# Patient Record
Sex: MALE | Race: WHITE | NOT HISPANIC OR LATINO | Employment: OTHER | URBAN - METROPOLITAN AREA
[De-identification: names, ages, dates, MRNs, and addresses within clinical notes are randomized per-mention and may not be internally consistent; named-entity substitution may affect disease eponyms.]

---

## 2024-06-11 ENCOUNTER — APPOINTMENT (OUTPATIENT)
Dept: CARDIOLOGY | Facility: HOSPITAL | Age: 74
End: 2024-06-11
Payer: COMMERCIAL

## 2024-06-11 ENCOUNTER — HOSPITAL ENCOUNTER (INPATIENT)
Facility: HOSPITAL | Age: 74
LOS: 2 days | Discharge: HOME | End: 2024-06-14
Attending: STUDENT IN AN ORGANIZED HEALTH CARE EDUCATION/TRAINING PROGRAM | Admitting: INTERNAL MEDICINE
Payer: COMMERCIAL

## 2024-06-11 ENCOUNTER — APPOINTMENT (OUTPATIENT)
Dept: RADIOLOGY | Facility: HOSPITAL | Age: 74
End: 2024-06-11
Payer: COMMERCIAL

## 2024-06-11 DIAGNOSIS — I50.9 ACUTE ON CHRONIC CONGESTIVE HEART FAILURE, UNSPECIFIED HEART FAILURE TYPE (MULTI): ICD-10-CM

## 2024-06-11 DIAGNOSIS — R07.9 CHEST PAIN, UNSPECIFIED: ICD-10-CM

## 2024-06-11 DIAGNOSIS — R55 SYNCOPE, UNSPECIFIED SYNCOPE TYPE: Primary | ICD-10-CM

## 2024-06-11 DIAGNOSIS — F17.219 CIGARETTE NICOTINE DEPENDENCE WITH NICOTINE-INDUCED DISORDER: ICD-10-CM

## 2024-06-11 DIAGNOSIS — I47.20 VENTRICULAR TACHYCARDIA (MULTI): ICD-10-CM

## 2024-06-11 DIAGNOSIS — I47.29 OTHER VENTRICULAR TACHYCARDIA (MULTI): ICD-10-CM

## 2024-06-11 DIAGNOSIS — R55 SYNCOPE AND COLLAPSE: ICD-10-CM

## 2024-06-11 LAB
ALBUMIN SERPL BCP-MCNC: 4 G/DL (ref 3.4–5)
ALP SERPL-CCNC: 51 U/L (ref 33–136)
ALT SERPL W P-5'-P-CCNC: 15 U/L (ref 10–52)
ANION GAP SERPL CALC-SCNC: 10 MMOL/L (ref 10–20)
AST SERPL W P-5'-P-CCNC: 15 U/L (ref 9–39)
BASOPHILS # BLD AUTO: 0.06 X10*3/UL (ref 0–0.1)
BASOPHILS NFR BLD AUTO: 1 %
BILIRUB SERPL-MCNC: 0.5 MG/DL (ref 0–1.2)
BNP SERPL-MCNC: 252 PG/ML (ref 0–99)
BUN SERPL-MCNC: 19 MG/DL (ref 6–23)
CALCIUM SERPL-MCNC: 8.9 MG/DL (ref 8.6–10.3)
CARDIAC TROPONIN I PNL SERPL HS: 6 NG/L (ref 0–20)
CARDIAC TROPONIN I PNL SERPL HS: 9 NG/L (ref 0–20)
CHLORIDE SERPL-SCNC: 106 MMOL/L (ref 98–107)
CO2 SERPL-SCNC: 27 MMOL/L (ref 21–32)
CREAT SERPL-MCNC: 0.7 MG/DL (ref 0.5–1.3)
EGFRCR SERPLBLD CKD-EPI 2021: >90 ML/MIN/1.73M*2
EOSINOPHIL # BLD AUTO: 0.14 X10*3/UL (ref 0–0.4)
EOSINOPHIL NFR BLD AUTO: 2.3 %
ERYTHROCYTE [DISTWIDTH] IN BLOOD BY AUTOMATED COUNT: 14.1 % (ref 11.5–14.5)
GLUCOSE SERPL-MCNC: 114 MG/DL (ref 74–99)
HCT VFR BLD AUTO: 44.4 % (ref 41–52)
HGB BLD-MCNC: 14.3 G/DL (ref 13.5–17.5)
IMM GRANULOCYTES # BLD AUTO: 0.02 X10*3/UL (ref 0–0.5)
IMM GRANULOCYTES NFR BLD AUTO: 0.3 % (ref 0–0.9)
LYMPHOCYTES # BLD AUTO: 1.7 X10*3/UL (ref 0.8–3)
LYMPHOCYTES NFR BLD AUTO: 27.7 %
MAGNESIUM SERPL-MCNC: 2.06 MG/DL (ref 1.6–2.4)
MCH RBC QN AUTO: 29.7 PG (ref 26–34)
MCHC RBC AUTO-ENTMCNC: 32.2 G/DL (ref 32–36)
MCV RBC AUTO: 92 FL (ref 80–100)
MONOCYTES # BLD AUTO: 0.56 X10*3/UL (ref 0.05–0.8)
MONOCYTES NFR BLD AUTO: 9.1 %
NEUTROPHILS # BLD AUTO: 3.66 X10*3/UL (ref 1.6–5.5)
NEUTROPHILS NFR BLD AUTO: 59.6 %
NRBC BLD-RTO: 0 /100 WBCS (ref 0–0)
PLATELET # BLD AUTO: 266 X10*3/UL (ref 150–450)
POTASSIUM SERPL-SCNC: 4.3 MMOL/L (ref 3.5–5.3)
PROT SERPL-MCNC: 6.5 G/DL (ref 6.4–8.2)
RBC # BLD AUTO: 4.81 X10*6/UL (ref 4.5–5.9)
SODIUM SERPL-SCNC: 139 MMOL/L (ref 136–145)
WBC # BLD AUTO: 6.1 X10*3/UL (ref 4.4–11.3)

## 2024-06-11 PROCEDURE — G0378 HOSPITAL OBSERVATION PER HR: HCPCS

## 2024-06-11 PROCEDURE — 2500000002 HC RX 250 W HCPCS SELF ADMINISTERED DRUGS (ALT 637 FOR MEDICARE OP, ALT 636 FOR OP/ED): Performed by: NURSE PRACTITIONER

## 2024-06-11 PROCEDURE — 36415 COLL VENOUS BLD VENIPUNCTURE: CPT | Performed by: STUDENT IN AN ORGANIZED HEALTH CARE EDUCATION/TRAINING PROGRAM

## 2024-06-11 PROCEDURE — 80053 COMPREHEN METABOLIC PANEL: CPT | Performed by: STUDENT IN AN ORGANIZED HEALTH CARE EDUCATION/TRAINING PROGRAM

## 2024-06-11 PROCEDURE — 71275 CT ANGIOGRAPHY CHEST: CPT

## 2024-06-11 PROCEDURE — 2500000001 HC RX 250 WO HCPCS SELF ADMINISTERED DRUGS (ALT 637 FOR MEDICARE OP): Performed by: NURSE PRACTITIONER

## 2024-06-11 PROCEDURE — G0390 TRAUMA RESPONS W/HOSP CRITI: HCPCS

## 2024-06-11 PROCEDURE — 94640 AIRWAY INHALATION TREATMENT: CPT

## 2024-06-11 PROCEDURE — 71045 X-RAY EXAM CHEST 1 VIEW: CPT | Performed by: STUDENT IN AN ORGANIZED HEALTH CARE EDUCATION/TRAINING PROGRAM

## 2024-06-11 PROCEDURE — 70450 CT HEAD/BRAIN W/O DYE: CPT | Performed by: STUDENT IN AN ORGANIZED HEALTH CARE EDUCATION/TRAINING PROGRAM

## 2024-06-11 PROCEDURE — 2500000004 HC RX 250 GENERAL PHARMACY W/ HCPCS (ALT 636 FOR OP/ED): Performed by: NURSE PRACTITIONER

## 2024-06-11 PROCEDURE — 84484 ASSAY OF TROPONIN QUANT: CPT | Mod: 91 | Performed by: STUDENT IN AN ORGANIZED HEALTH CARE EDUCATION/TRAINING PROGRAM

## 2024-06-11 PROCEDURE — 85025 COMPLETE CBC W/AUTO DIFF WBC: CPT | Performed by: STUDENT IN AN ORGANIZED HEALTH CARE EDUCATION/TRAINING PROGRAM

## 2024-06-11 PROCEDURE — 83735 ASSAY OF MAGNESIUM: CPT | Performed by: STUDENT IN AN ORGANIZED HEALTH CARE EDUCATION/TRAINING PROGRAM

## 2024-06-11 PROCEDURE — 71045 X-RAY EXAM CHEST 1 VIEW: CPT

## 2024-06-11 PROCEDURE — 99285 EMERGENCY DEPT VISIT HI MDM: CPT | Mod: 25

## 2024-06-11 PROCEDURE — 70450 CT HEAD/BRAIN W/O DYE: CPT

## 2024-06-11 PROCEDURE — 72125 CT NECK SPINE W/O DYE: CPT | Performed by: STUDENT IN AN ORGANIZED HEALTH CARE EDUCATION/TRAINING PROGRAM

## 2024-06-11 PROCEDURE — 84484 ASSAY OF TROPONIN QUANT: CPT | Performed by: STUDENT IN AN ORGANIZED HEALTH CARE EDUCATION/TRAINING PROGRAM

## 2024-06-11 PROCEDURE — 71275 CT ANGIOGRAPHY CHEST: CPT | Performed by: RADIOLOGY

## 2024-06-11 PROCEDURE — 2550000001 HC RX 255 CONTRASTS: Performed by: STUDENT IN AN ORGANIZED HEALTH CARE EDUCATION/TRAINING PROGRAM

## 2024-06-11 PROCEDURE — 83880 ASSAY OF NATRIURETIC PEPTIDE: CPT | Performed by: STUDENT IN AN ORGANIZED HEALTH CARE EDUCATION/TRAINING PROGRAM

## 2024-06-11 PROCEDURE — 93005 ELECTROCARDIOGRAM TRACING: CPT

## 2024-06-11 PROCEDURE — 74174 CTA ABD&PLVS W/CONTRAST: CPT | Performed by: RADIOLOGY

## 2024-06-11 PROCEDURE — 72125 CT NECK SPINE W/O DYE: CPT

## 2024-06-11 RX ORDER — LANOLIN ALCOHOL/MO/W.PET/CERES
1000 CREAM (GRAM) TOPICAL DAILY
Status: DISCONTINUED | OUTPATIENT
Start: 2024-06-12 | End: 2024-06-14 | Stop reason: HOSPADM

## 2024-06-11 RX ORDER — ALBUTEROL SULFATE 0.83 MG/ML
2.5 SOLUTION RESPIRATORY (INHALATION) EVERY 4 HOURS PRN
Status: DISCONTINUED | OUTPATIENT
Start: 2024-06-11 | End: 2024-06-11

## 2024-06-11 RX ORDER — ACETAMINOPHEN 650 MG/1
650 SUPPOSITORY RECTAL EVERY 4 HOURS PRN
Status: DISCONTINUED | OUTPATIENT
Start: 2024-06-11 | End: 2024-06-14 | Stop reason: HOSPADM

## 2024-06-11 RX ORDER — SPIRONOLACTONE 25 MG/1
0.5 TABLET ORAL DAILY
COMMUNITY
End: 2024-06-14 | Stop reason: HOSPADM

## 2024-06-11 RX ORDER — IPRATROPIUM BROMIDE AND ALBUTEROL SULFATE 2.5; .5 MG/3ML; MG/3ML
3 SOLUTION RESPIRATORY (INHALATION) EVERY 2 HOUR PRN
Status: DISCONTINUED | OUTPATIENT
Start: 2024-06-11 | End: 2024-06-14 | Stop reason: HOSPADM

## 2024-06-11 RX ORDER — CARVEDILOL 6.25 MG/1
6.25 TABLET ORAL 2 TIMES DAILY
COMMUNITY

## 2024-06-11 RX ORDER — ALBUTEROL SULFATE 0.83 MG/ML
2.5 SOLUTION RESPIRATORY (INHALATION) AS NEEDED
COMMUNITY
End: 2024-06-14 | Stop reason: HOSPADM

## 2024-06-11 RX ORDER — ATORVASTATIN CALCIUM 40 MG/1
40 TABLET, FILM COATED ORAL DAILY
Status: DISCONTINUED | OUTPATIENT
Start: 2024-06-12 | End: 2024-06-14 | Stop reason: HOSPADM

## 2024-06-11 RX ORDER — ACETAMINOPHEN 325 MG/1
650 TABLET ORAL EVERY 4 HOURS PRN
Status: DISCONTINUED | OUTPATIENT
Start: 2024-06-11 | End: 2024-06-14 | Stop reason: HOSPADM

## 2024-06-11 RX ORDER — IPRATROPIUM BROMIDE AND ALBUTEROL SULFATE 2.5; .5 MG/3ML; MG/3ML
3 SOLUTION RESPIRATORY (INHALATION)
Status: DISCONTINUED | OUTPATIENT
Start: 2024-06-11 | End: 2024-06-11

## 2024-06-11 RX ORDER — MAGNESIUM SULFATE HEPTAHYDRATE 40 MG/ML
2 INJECTION, SOLUTION INTRAVENOUS ONCE
Status: COMPLETED | OUTPATIENT
Start: 2024-06-11 | End: 2024-06-12

## 2024-06-11 RX ORDER — CLOPIDOGREL BISULFATE 75 MG/1
75 TABLET ORAL DAILY
COMMUNITY

## 2024-06-11 RX ORDER — CLOPIDOGREL BISULFATE 75 MG/1
75 TABLET ORAL DAILY
Status: DISCONTINUED | OUTPATIENT
Start: 2024-06-12 | End: 2024-06-14 | Stop reason: HOSPADM

## 2024-06-11 RX ORDER — ACETAMINOPHEN 160 MG/5ML
650 SOLUTION ORAL EVERY 4 HOURS PRN
Status: DISCONTINUED | OUTPATIENT
Start: 2024-06-11 | End: 2024-06-14 | Stop reason: HOSPADM

## 2024-06-11 RX ORDER — CARVEDILOL 3.12 MG/1
6.25 TABLET ORAL 2 TIMES DAILY
Status: DISCONTINUED | OUTPATIENT
Start: 2024-06-11 | End: 2024-06-14 | Stop reason: HOSPADM

## 2024-06-11 RX ORDER — IBUPROFEN 200 MG
1 TABLET ORAL DAILY PRN
Status: DISCONTINUED | OUTPATIENT
Start: 2024-06-11 | End: 2024-06-14 | Stop reason: HOSPADM

## 2024-06-11 RX ORDER — LANOLIN ALCOHOL/MO/W.PET/CERES
1000 CREAM (GRAM) TOPICAL DAILY
COMMUNITY

## 2024-06-11 RX ORDER — CHOLECALCIFEROL (VITAMIN D3) 25 MCG
1000 TABLET ORAL DAILY
Status: DISCONTINUED | OUTPATIENT
Start: 2024-06-12 | End: 2024-06-14 | Stop reason: HOSPADM

## 2024-06-11 RX ORDER — ALBUTEROL SULFATE 0.83 MG/ML
2.5 SOLUTION RESPIRATORY (INHALATION) EVERY 2 HOUR PRN
Status: DISCONTINUED | OUTPATIENT
Start: 2024-06-11 | End: 2024-06-12

## 2024-06-11 RX ORDER — IPRATROPIUM BROMIDE AND ALBUTEROL SULFATE 2.5; .5 MG/3ML; MG/3ML
3 SOLUTION RESPIRATORY (INHALATION)
Status: COMPLETED | OUTPATIENT
Start: 2024-06-11 | End: 2024-06-11

## 2024-06-11 RX ORDER — ALBUTEROL SULFATE 90 UG/1
1-2 AEROSOL, METERED RESPIRATORY (INHALATION) EVERY 6 HOURS PRN
Status: ON HOLD | COMMUNITY
End: 2024-06-14

## 2024-06-11 RX ORDER — VIT C/E/ZN/COPPR/LUTEIN/ZEAXAN 250MG-90MG
25 CAPSULE ORAL DAILY
COMMUNITY

## 2024-06-11 RX ORDER — ATORVASTATIN CALCIUM 40 MG/1
40 TABLET, FILM COATED ORAL DAILY
COMMUNITY

## 2024-06-11 RX ADMIN — IPRATROPIUM BROMIDE AND ALBUTEROL SULFATE 3 ML: .5; 3 SOLUTION RESPIRATORY (INHALATION) at 19:32

## 2024-06-11 RX ADMIN — SACUBITRIL AND VALSARTAN 1 TABLET: 24; 26 TABLET, FILM COATED ORAL at 23:29

## 2024-06-11 RX ADMIN — MAGNESIUM SULFATE HEPTAHYDRATE 2 G: 40 INJECTION, SOLUTION INTRAVENOUS at 23:30

## 2024-06-11 RX ADMIN — APIXABAN 5 MG: 5 TABLET, FILM COATED ORAL at 23:29

## 2024-06-11 RX ADMIN — IPRATROPIUM BROMIDE AND ALBUTEROL SULFATE 3 ML: .5; 3 SOLUTION RESPIRATORY (INHALATION) at 19:31

## 2024-06-11 RX ADMIN — CARVEDILOL 6.25 MG: 3.12 TABLET, FILM COATED ORAL at 23:29

## 2024-06-11 RX ADMIN — METHYLPREDNISOLONE SODIUM SUCCINATE 125 MG: 125 INJECTION, POWDER, FOR SOLUTION INTRAMUSCULAR; INTRAVENOUS at 23:29

## 2024-06-11 RX ADMIN — IPRATROPIUM BROMIDE AND ALBUTEROL SULFATE 3 ML: .5; 3 SOLUTION RESPIRATORY (INHALATION) at 19:36

## 2024-06-11 RX ADMIN — IOHEXOL 109 ML: 350 INJECTION, SOLUTION INTRAVENOUS at 16:49

## 2024-06-11 SDOH — SOCIAL STABILITY: SOCIAL INSECURITY: WERE YOU ABLE TO COMPLETE ALL THE BEHAVIORAL HEALTH SCREENINGS?: YES

## 2024-06-11 SDOH — SOCIAL STABILITY: SOCIAL INSECURITY: DO YOU FEEL ANYONE HAS EXPLOITED OR TAKEN ADVANTAGE OF YOU FINANCIALLY OR OF YOUR PERSONAL PROPERTY?: NO

## 2024-06-11 SDOH — SOCIAL STABILITY: SOCIAL INSECURITY: ARE THERE ANY APPARENT SIGNS OF INJURIES/BEHAVIORS THAT COULD BE RELATED TO ABUSE/NEGLECT?: NO

## 2024-06-11 SDOH — SOCIAL STABILITY: SOCIAL INSECURITY: HAS ANYONE EVER THREATENED TO HURT YOUR FAMILY OR YOUR PETS?: NO

## 2024-06-11 SDOH — SOCIAL STABILITY: SOCIAL INSECURITY: ARE YOU OR HAVE YOU BEEN THREATENED OR ABUSED PHYSICALLY, EMOTIONALLY, OR SEXUALLY BY ANYONE?: NO

## 2024-06-11 SDOH — SOCIAL STABILITY: SOCIAL INSECURITY: DOES ANYONE TRY TO KEEP YOU FROM HAVING/CONTACTING OTHER FRIENDS OR DOING THINGS OUTSIDE YOUR HOME?: NO

## 2024-06-11 SDOH — SOCIAL STABILITY: SOCIAL INSECURITY: DO YOU FEEL UNSAFE GOING BACK TO THE PLACE WHERE YOU ARE LIVING?: NO

## 2024-06-11 SDOH — SOCIAL STABILITY: SOCIAL INSECURITY: ABUSE: ADULT

## 2024-06-11 SDOH — SOCIAL STABILITY: SOCIAL INSECURITY: HAVE YOU HAD THOUGHTS OF HARMING ANYONE ELSE?: NO

## 2024-06-11 SDOH — SOCIAL STABILITY: SOCIAL INSECURITY: HAVE YOU HAD ANY THOUGHTS OF HARMING ANYONE ELSE?: NO

## 2024-06-11 ASSESSMENT — COGNITIVE AND FUNCTIONAL STATUS - GENERAL
MOVING TO AND FROM BED TO CHAIR: A LITTLE
DAILY ACTIVITIY SCORE: 22
PATIENT BASELINE BEDBOUND: NO
TOILETING: A LITTLE
MOBILITY SCORE: 19
WALKING IN HOSPITAL ROOM: A LITTLE
HELP NEEDED FOR BATHING: A LITTLE
STANDING UP FROM CHAIR USING ARMS: A LITTLE
CLIMB 3 TO 5 STEPS WITH RAILING: A LOT

## 2024-06-11 ASSESSMENT — ACTIVITIES OF DAILY LIVING (ADL)
LACK_OF_TRANSPORTATION: NO
JUDGMENT_ADEQUATE_SAFELY_COMPLETE_DAILY_ACTIVITIES: YES
ADEQUATE_TO_COMPLETE_ADL: YES
FEEDING YOURSELF: INDEPENDENT
GROOMING: INDEPENDENT
WALKS IN HOME: NEEDS ASSISTANCE
HEARING - RIGHT EAR: FUNCTIONAL
TOILETING: NEEDS ASSISTANCE
BATHING: INDEPENDENT
PATIENT'S MEMORY ADEQUATE TO SAFELY COMPLETE DAILY ACTIVITIES?: YES
DRESSING YOURSELF: INDEPENDENT
HEARING - LEFT EAR: FUNCTIONAL

## 2024-06-11 ASSESSMENT — COLUMBIA-SUICIDE SEVERITY RATING SCALE - C-SSRS
2. HAVE YOU ACTUALLY HAD ANY THOUGHTS OF KILLING YOURSELF?: NO
1. IN THE PAST MONTH, HAVE YOU WISHED YOU WERE DEAD OR WISHED YOU COULD GO TO SLEEP AND NOT WAKE UP?: NO
6. HAVE YOU EVER DONE ANYTHING, STARTED TO DO ANYTHING, OR PREPARED TO DO ANYTHING TO END YOUR LIFE?: NO
2. HAVE YOU ACTUALLY HAD ANY THOUGHTS OF KILLING YOURSELF?: NO
1. IN THE PAST MONTH, HAVE YOU WISHED YOU WERE DEAD OR WISHED YOU COULD GO TO SLEEP AND NOT WAKE UP?: NO
6. HAVE YOU EVER DONE ANYTHING, STARTED TO DO ANYTHING, OR PREPARED TO DO ANYTHING TO END YOUR LIFE?: NO

## 2024-06-11 ASSESSMENT — PATIENT HEALTH QUESTIONNAIRE - PHQ9
1. LITTLE INTEREST OR PLEASURE IN DOING THINGS: NOT AT ALL
2. FEELING DOWN, DEPRESSED OR HOPELESS: NOT AT ALL
SUM OF ALL RESPONSES TO PHQ9 QUESTIONS 1 & 2: 0

## 2024-06-11 ASSESSMENT — LIFESTYLE VARIABLES
HOW MANY STANDARD DRINKS CONTAINING ALCOHOL DO YOU HAVE ON A TYPICAL DAY: PATIENT DOES NOT DRINK
HAVE PEOPLE ANNOYED YOU BY CRITICIZING YOUR DRINKING: NO
TOTAL SCORE: 0
PRESCIPTION_ABUSE_PAST_12_MONTHS: NO
HOW OFTEN DO YOU HAVE A DRINK CONTAINING ALCOHOL: NEVER
HAVE YOU EVER FELT YOU SHOULD CUT DOWN ON YOUR DRINKING: NO
EVER FELT BAD OR GUILTY ABOUT YOUR DRINKING: NO
AUDIT-C TOTAL SCORE: 0
EVER HAD A DRINK FIRST THING IN THE MORNING TO STEADY YOUR NERVES TO GET RID OF A HANGOVER: NO
HOW OFTEN DO YOU HAVE 6 OR MORE DRINKS ON ONE OCCASION: NEVER
SKIP TO QUESTIONS 9-10: 1
AUDIT-C TOTAL SCORE: 0
SUBSTANCE_ABUSE_PAST_12_MONTHS: NO

## 2024-06-11 ASSESSMENT — PAIN - FUNCTIONAL ASSESSMENT
PAIN_FUNCTIONAL_ASSESSMENT: 0-10
PAIN_FUNCTIONAL_ASSESSMENT: 0-10

## 2024-06-11 ASSESSMENT — PAIN SCALES - GENERAL
PAINLEVEL_OUTOF10: 0 - NO PAIN

## 2024-06-11 NOTE — H&P
History Of Present Illness  Osmany Michele is a 73-year-old male with past medical history of CAD s/p multiple stents, SSS s/p PPM, s/p aortic repair/graft, nicotine use disorder, and COPD.  [facility] due to reported chest pain and syncope.  Patient states that he told the office that he had difficult time sleeping the night prior and was having chest pain and felt that it was his COPD causing him issues.  He does endorse recent increased utilization of rescue inhaler and nebulizers at home the past several days.  Reports cough, orthopnea, and wheezing.  2 nights ago he additionally had a syncopal episode.  Reports that he was sitting on the side of the bed, when he stood up he became lightheaded and passed out.  States he fell forward.  Follow-up broken by bags on the floor, however he does endorse hitting his head.  Immediately regained consciousness. He denies any associated injuries.    ER course: Hemodynamically stable, mildly tachypneic 20-30.  EKG showed atrial paced rhythm, no recent EKG for comparison.  CBC unremarkable.  Glucose 114, CMP normal otherwise.  .  High-sensitivity troponin 9, repeat 6.  CT head without acute findings.  Small geographic area of cystic encephalomalacia and gliosis present in the right occipital lobe likely representing sequela of prior infarct/injury.  CT C-spine without acute trauma.  CT angio chest without acute pathology. infrarenal aortobiiliac stent graft without aneurysm or endoleak..  Presently moderate stenosis of the left external iliac artery.  Left lateral saccular outpouching at the level of the diaphragm short in length where the aorta measures 32 mm in caliber.  Atherosclerotic changes with few areas of small penetrating atherosclerotic ulcers in the thoracic aortic arch and descending thoracic aortic.  Dilated loops of the jejunum in the left abdomen.  Age-indeterminate L2 and L4 vertebral body compression  deformities.    Limited records in EMR for comparison    Past medical history: As above  Past surgical history: As above; additional history of left hip fracture repair  Social history: Current smoker, trying to quit.  Denies alcohol abuse or illicit drug use.  [facility]  Family history: Heart disease.     Past Medical History  No past medical history on file.    Surgical History  No past surgical history on file.     Social History  He has no history on file for tobacco use, alcohol use, and drug use.    Family History  No family history on file.     Allergies  Effexor [venlafaxine] and Baclofen    Review of Systems     10 point ROS negative except as noted above.    Physical Exam  Constitutional:       General: He is awake. He is not in acute distress.     Appearance: Normal appearance. He is not toxic-appearing.   HENT:      Head: Atraumatic.      Nose: Nose normal.      Mouth/Throat:      Mouth: Mucous membranes are moist.   Eyes:      Extraocular Movements: Extraocular movements intact.      Conjunctiva/sclera: Conjunctivae normal.      Pupils: Pupils are equal, round, and reactive to light.   Cardiovascular:      Rate and Rhythm: Normal rate and regular rhythm.      Pulses: Normal pulses.      Heart sounds: No murmur heard.  Pulmonary:      Effort: Respiratory distress present.      Breath sounds: Wheezing present.      Comments: Diminished breath sounds and cough  Abdominal:      General: Bowel sounds are normal. There is no distension.      Palpations: Abdomen is soft.      Tenderness: There is no abdominal tenderness. There is no guarding.   Musculoskeletal:         General: No swelling, deformity or signs of injury. Normal range of motion.      Cervical back: Neck supple.      Right lower leg: No edema.      Left lower leg: No edema.   Skin:     General: Skin is warm and dry.      Capillary Refill: Capillary refill takes less than 2 seconds.      Findings: No ecchymosis, erythema or  "wound.   Neurological:      General: No focal deficit present.      Mental Status: He is alert and oriented to person, place, and time.   Psychiatric:         Mood and Affect: Mood normal.         Behavior: Behavior is cooperative.          Last Recorded Vitals  Blood pressure 134/85, pulse 62, temperature 36.4 °C (97.5 °F), temperature source Temporal, resp. rate 20, height 1.854 m (6' 1\"), weight 78 kg (172 lb), SpO2 98%.    Relevant Results      CT head W O contrast trauma protocol    Result Date: 6/11/2024  Interpreted By:  Blaise Noonan, STUDY: CT HEAD W/O CONTRAST TRAUMA PROTOCOL; CT CERVICAL SPINE WO IV CONTRAST;  6/11/2024 4:47 pm   INDICATION: Signs/Symptoms:syncope, head trauma on eliquis.   COMPARISON: None.   ACCESSION NUMBER(S): [id number]; [id number]   ORDERING CLINICIAN: JOSE ALEJANDRO STEPHENS   TECHNIQUE: Noncontrast axial CT scan of head was performed, with coronal and sagittal reformats provided. The images were reviewed in bone, brain, blood and soft tissue windows.   Axial CT images of the cervical spine are obtained. Axial, coronal and sagittal reconstructions are provided for review.   FINDINGS: CT HEAD:   No hyperdense intracranial hemorrhage is identified. No mass effect or midline shift is present.   Gray-white differentiation is intact without evidence of acute CT apparent transcortical infarct. Small focus of encephalomalacia and gliosis is present in the right occipital lobe (series 203, image 54) likely representing sequela of prior infarct/injury.   No ventricular dilatation is present. Basal cisterns are patent. No extra-axial fluid collections are identified.   Scalp soft tissues do not demonstrate any acute abnormality. Calvarium is unremarkable. Mastoid air cells and middle ear cavities are clear.   Visualized paranasal sinuses are unremarkable in appearance.   CT C-SPINE:   There is slight reversal normal lordotic curvature of the cervical spine, with a 2-3 mm anterolisthesis " of C7 on T1.   Cervical vertebral body heights are preserved without evidence of compression fractures, although mild multilevel insufficiency endplate changes are present with associated Schmorl's nodes, most pronounced along the superior endplate of C4, superior endplate of C5 and superior endplate of C6.   Craniocervical junction is intact. Facet joints are preserved without evidence of subluxation or perching. No evidence of acute trauma to the posterior elements of the cervical spine is identified. No displaced transverse or spinous process fracture is present.   Multilevel intervertebral disc height loss is present, moderate at C4-C5 and C5-C6.   No high-grade stenosis is identified in the cervical spine, although at least mild spinal canal narrowing is suspected at the levels of C4-C5 and C5-C6 due to disc osteophyte complexes and ligamentum flavum thickening.   Mild-to-moderate neural foraminal narrowing is present at the level of C4-C5 and C5-C6 bilaterally due to endplate spurring hypertrophic uncovertebral and facet joint changes.       CT HEAD: 1. No evidence of hemorrhage, skull fracture, or other acute intracranial trauma/abnormality. 2. Small geographic area of cystic encephalomalacia and gliosis is present in the right occipital lobe, likely representing sequela of prior infarct/injury.   CT C-SPINE: 1. No evidence of acute trauma to the cervical spine. 2. Multilevel degenerative changes of the cervical spine, with mild spinal canal narrowing suspected at the levels of C4-C5 and C5-C6 due to disc osteophyte complex and ligamentum flavum thickening mild-to-moderate neural foraminal stenosis suspected at C4-C5 and C5-C6 due to endplate spurring and hypertrophic uncovertebral and facet joint changes..   MACRO: None   Signed by: Blaise Noonan 6/11/2024 5:22 PM Dictation workstation:   [id number]    CT cervical spine wo IV contrast    Result Date: 6/11/2024  Interpreted By:  Shaista  Blaise, STUDY: CT HEAD W/O CONTRAST TRAUMA PROTOCOL; CT CERVICAL SPINE WO IV CONTRAST;  6/11/2024 4:47 pm   INDICATION: Signs/Symptoms:syncope, head trauma on eliquis.   COMPARISON: None.   ACCESSION NUMBER(S): [id number]; [id number]   ORDERING CLINICIAN: JOSE ALEJANDRO STEPHENS   TECHNIQUE: Noncontrast axial CT scan of head was performed, with coronal and sagittal reformats provided. The images were reviewed in bone, brain, blood and soft tissue windows.   Axial CT images of the cervical spine are obtained. Axial, coronal and sagittal reconstructions are provided for review.   FINDINGS: CT HEAD:   No hyperdense intracranial hemorrhage is identified. No mass effect or midline shift is present.   Gray-white differentiation is intact without evidence of acute CT apparent transcortical infarct. Small focus of encephalomalacia and gliosis is present in the right occipital lobe (series 203, image 54) likely representing sequela of prior infarct/injury.   No ventricular dilatation is present. Basal cisterns are patent. No extra-axial fluid collections are identified.   Scalp soft tissues do not demonstrate any acute abnormality. Calvarium is unremarkable. Mastoid air cells and middle ear cavities are clear.   Visualized paranasal sinuses are unremarkable in appearance.   CT C-SPINE:   There is slight reversal normal lordotic curvature of the cervical spine, with a 2-3 mm anterolisthesis of C7 on T1.   Cervical vertebral body heights are preserved without evidence of compression fractures, although mild multilevel insufficiency endplate changes are present with associated Schmorl's nodes, most pronounced along the superior endplate of C4, superior endplate of C5 and superior endplate of C6.   Craniocervical junction is intact. Facet joints are preserved without evidence of subluxation or perching. No evidence of acute trauma to the posterior elements of the cervical spine is identified. No displaced transverse or spinous  process fracture is present.   Multilevel intervertebral disc height loss is present, moderate at C4-C5 and C5-C6.   No high-grade stenosis is identified in the cervical spine, although at least mild spinal canal narrowing is suspected at the levels of C4-C5 and C5-C6 due to disc osteophyte complexes and ligamentum flavum thickening.   Mild-to-moderate neural foraminal narrowing is present at the level of C4-C5 and C5-C6 bilaterally due to endplate spurring hypertrophic uncovertebral and facet joint changes.       CT HEAD: 1. No evidence of hemorrhage, skull fracture, or other acute intracranial trauma/abnormality. 2. Small geographic area of cystic encephalomalacia and gliosis is present in the right occipital lobe, likely representing sequela of prior infarct/injury.   CT C-SPINE: 1. No evidence of acute trauma to the cervical spine. 2. Multilevel degenerative changes of the cervical spine, with mild spinal canal narrowing suspected at the levels of C4-C5 and C5-C6 due to disc osteophyte complex and ligamentum flavum thickening mild-to-moderate neural foraminal stenosis suspected at C4-C5 and C5-C6 due to endplate spurring and hypertrophic uncovertebral and facet joint changes..   MACRO: None   Signed by: Blaise Noonan 6/11/2024 5:22 PM Dictation workstation:   [id number]    CT angio chest abdomen pelvis    Result Date: 6/11/2024  Interpreted By:  Gordo Maldonado, STUDY: CT ANGIO CHEST ABDOMEN PELVIS; ;  6/11/2024 4:47 pm   CT ANGIOGRAM CHEST, ABDOMEN AND PELVIS WITHOUT AND WITH CONTRAST   INDICATION: Signs/Symptoms:h/o aorta repiair with CP and syncope. 73-year-old man with chest pain.   COMPARISON: Chest radiograph 06/11/2024   ACCESSION NUMBER(S): [id number]   ORDERING CLINICIAN: JOSE ALEJANDRO STEPHENS   TECHNIQUE: Contiguous acquired helical axial images were obtained of the chest, abdomen, and pelvis without and with intravascular contrast. Coronal and sagittal multiplanar reformatted maximum intensity  projection images were obtained. 3D volumetric surface rendered representation of arterial structures was performed on a separate workstation and submitted for interpretation.   FINDINGS: Angiographic:   The heart size is enlarged. Right heart pacer leads enter the left subclavian vein adjacent to the left anterosuperior chest wall generator tract.   The examination is not optimized for sensitive evaluation of pulmonary arteries. There are no large central pulmonary artery filling defects.   The ascending thoracic aorta all and arch vessels are normal in caliber and patent. There is mild-to-moderate intimal irregularity with calcified and noncalcified atherosclerotic plaque of the aortic arch which is nonaneurysmal by size criteria. There is irregularity of noncalcified atherosclerotic plaque of the descending thoracic aorta with few areas of of small positive the ventral wall penetrating atherosclerotic ulceration (for example image [id number]).   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 The celiac, superior mesenteric, and bilateral renal arteries are patent. The patient is status post infrarenal abdominal aorto bi-iliac stent graft placement with patency of graft components extending into the iliac arteries bilaterally. The origin of the inferior mesenteric artery has been covered however distal branches are opacified via collateralization.   The right common iliac artery stent graft component is patent. The right internal and external iliac arteries are patent. There is mild atherosclerotic narrowing of the right common femoral artery and the partially imaged upstream right superficial and profunda femoris arteries are patent.   The left common iliac artery stent graft component is patent. There is tortuosity of the left internal and external iliac arteries with  associated moderate to severe narrowing of the left external iliac artery (image 521 of 633). Distally, the left internal and external iliac arteries are patent. The left common femoral arteries mildly atherosclerotic and patent. The partially imaged upstream left superficial and profunda femoris arteries are patent.   Large iliac veins the pelvis and the IVC are grossly unremarkable. The renal veins are patent. The portal, superior mesenteric, and splenic veins are patent. Hepatic veins are patent. Suspect high-grade stenosis or possible occlusion of the left brachiocephalic vein adjacent to pacer leads. The internal jugular veins are patent bilaterally.       Nonangiographic:   The partially imaged thyroid is unremarkable. No supraclavicular or thoracic inlet lymphadenopathy. No pathologically enlarged mediastinal or hilar lymph nodes. There is apical predominant centrilobular emphysema. Respiratory motion limits evaluation of the pulmonary parenchyma. There a few areas of pleural calcification including the hemidiaphragms and the posteromedial aspects of the parietal pleura. No pleural effusion. The liver is normal in size and shape. The gallbladder is nondistended. No biliary ductal dilation. The spleen, adrenal glands, and pancreas are unremarkable. The renal parenchyma enhances symmetrically. A simple attenuating posterior left renal cystic lesion measuring 19 mm in diameter. Few other subcentimeter low attenuating renal lesions too small to further characterize. The urinary bladder is partially decompressed limiting evaluation. Prostate and seminal vesicles are unremarkable. No retroperitoneal lymphadenopathy. Hiatal hernia. Dilated loops of fluid filled jejunum in the left abdomen, with adjacent loops of wall thickening versus decompression. Distal loops are normal in caliber. Distal ileum shows gas and fluid as can be seen with feculization of the small bowel. The appendix is normal. Stool in the colon.  Sigmoid predominant diverticulosis without evidence of diverticulitis. There is a left inguinal hernia containing loops of small bowel without evidence of incarceration.   There is age indeterminate compression deformities of L3 and L2. Left femoral neck fixation hardware.         1. No acute aortic pathology. 2. Infrarenal aorto bi-iliac stent graft. No abdominal aortic aneurysm or evidence of endoleak. Stent graft components are patent. 3. Mild to moderate thoracic aortic arch and descending thoracic aortic atherosclerosis with few areas of small penetrating atherosclerotic ulcers. There is additionally left lateral saccular outpouching at the level of the diaphragm short in length where the aorta measures 32 mm in caliber. 4. Suspect at least moderate grade stenosis of the left external iliac artery related to atherosclerosis and tortuosity just distal to the left common iliac artery bifurcation.   5. Dilated loops of jejunum in the left abdomen, adjacent to areas of small bowel wall thickening versus is decompression. Consider functional obstruction, enteritis. No mechanical bowel obstruction identified. 6. Left inguinal hernia containing loops of small bowel without evidence of incarceration. 7. Sigmoid diverticulosis. 8. Age indeterminate L2 and L4 vertebral body compression deformities.   MACRO: None   Signed by: Gordo Maldonado 6/11/2024 5:22 PM Dictation workstation:   [id number]    XR chest 1 view    Result Date: 6/11/2024  Interpreted By:  Blaise Noonan, STUDY: XR CHEST 1 VIEW;  6/11/2024 4:10 pm   INDICATION: Signs/Symptoms:Chest Pain.   COMPARISON: Radiographs of the chest dated 10/27/2007.   ACCESSION NUMBER(S): [id number]   ORDERING CLINICIAN: JOSE ALEJANDRO STEPHENS   FINDINGS: AP radiograph of the chest was provided.   Multi lead left subclavian AICD/pacemaker is present, similar in appearance to prior exam.   CARDIOMEDIASTINAL SILHOUETTE: Cardiomediastinal silhouette is normal in size and  configuration.   LUNGS: No consolidation or pleural effusion is evident. Dense material in the right lung base may represent pleural calyx.   ABDOMEN: No remarkable upper abdominal findings.   BONES: No acute osseous changes.       1.  No evidence of consolidation or pleural effusion. New dense material in the right lung base may represent pleural calcifications.       MACRO: None   Signed by: Blaise Noonan 6/11/2024 4:31 PM Dictation workstation:   [id number]     Results for orders placed or performed during the hospital encounter of 06/11/24 (from the past 24 hour(s))   CBC and Auto Differential   Result Value Ref Range    WBC 6.1 4.4 - 11.3 x10*3/uL    nRBC 0.0 0.0 - 0.0 /100 WBCs    RBC 4.81 4.50 - 5.90 x10*6/uL    Hemoglobin 14.3 13.5 - 17.5 g/dL    Hematocrit 44.4 41.0 - 52.0 %    MCV 92 80 - 100 fL    MCH 29.7 26.0 - 34.0 pg    MCHC 32.2 32.0 - 36.0 g/dL    RDW 14.1 11.5 - 14.5 %    Platelets 266 150 - 450 x10*3/uL    Neutrophils % 59.6 40.0 - 80.0 %    Immature Granulocytes %, Automated 0.3 0.0 - 0.9 %    Lymphocytes % 27.7 13.0 - 44.0 %    Monocytes % 9.1 2.0 - 10.0 %    Eosinophils % 2.3 0.0 - 6.0 %    Basophils % 1.0 0.0 - 2.0 %    Neutrophils Absolute 3.66 1.60 - 5.50 x10*3/uL    Immature Granulocytes Absolute, Automated 0.02 0.00 - 0.50 x10*3/uL    Lymphocytes Absolute 1.70 0.80 - 3.00 x10*3/uL    Monocytes Absolute 0.56 0.05 - 0.80 x10*3/uL    Eosinophils Absolute 0.14 0.00 - 0.40 x10*3/uL    Basophils Absolute 0.06 0.00 - 0.10 x10*3/uL   Comprehensive Metabolic Panel   Result Value Ref Range    Glucose 114 (H) 74 - 99 mg/dL    Sodium 139 136 - 145 mmol/L    Potassium 4.3 3.5 - 5.3 mmol/L    Chloride 106 98 - 107 mmol/L    Bicarbonate 27 21 - 32 mmol/L    Anion Gap 10 10 - 20 mmol/L    Urea Nitrogen 19 6 - 23 mg/dL    Creatinine 0.70 0.50 - 1.30 mg/dL    eGFR >90 >60 mL/min/1.73m*2    Calcium 8.9 8.6 - 10.3 mg/dL    Albumin 4.0 3.4 - 5.0 g/dL    Alkaline Phosphatase 51 33 - 136 U/L    Total  Protein 6.5 6.4 - 8.2 g/dL    AST 15 9 - 39 U/L    Bilirubin, Total 0.5 0.0 - 1.2 mg/dL    ALT 15 10 - 52 U/L   Magnesium   Result Value Ref Range    Magnesium 2.06 1.60 - 2.40 mg/dL   B-Type Natriuretic Peptide   Result Value Ref Range     (H) 0 - 99 pg/mL   Troponin I, High Sensitivity, Initial   Result Value Ref Range    Troponin I, High Sensitivity 9 0 - 20 ng/L   Troponin, High Sensitivity, 1 Hour   Result Value Ref Range    Troponin I, High Sensitivity 6 0 - 20 ng/L          Assessment/Plan   Principal Problem:    Syncope and collapse    73-year-old male with past medical history of CAD s/p multiple stents, SSS s/p PPM, s/p aortic repair/graft, nicotine use disorder, and COPD.  [facility] due to reported chest pain and syncope.    #COPD exacerbation  #Chest pain suspect secondary to coughing and  #Nicotine use disorder    Pulmonology consult  Supplemental oxygen as needed  IV azithromycin once daily  Nebulizers as needed and scheduled  Solu-Medrol 40 mg every 8 hours  RT evaluate and treat  Nicotine patch as needed for nicotine cravings    #Syncope  #History of permanent pacemaker  #History CAD s/p multiple stents he is active and will help one of her after the rounding    Telemetry monitoring  Consult cardiology, appreciate input  Check orthostatic vitals  Echocardiogram  Carotid duplex  Continue home dose of Plavix and Eliquis    #Abnormal imaging  #History infrarenal aortobiiliac stent graft  Consult vascular surgery for input on abnormal imaging findings on CT angio chest    #DVT prophylaxis  Continue home dose of Eliquis    oJzef Wilson, APRN-CNP

## 2024-06-11 NOTE — PROGRESS NOTES
Pharmacy Medication History Review    Osmany Michele is a 73 y.o. male admitted for No Principal Problem: There is no principal problem currently on the Problem List. Please update the Problem List and refresh.. Pharmacy reviewed the patient's [id number] medications and allergies for accuracy.    The list below reflectives the updated PTA list. Please review each medication in order reconciliation for additional clarification and justification.    SEE NOTES IN PRIOR TO ADMISSION LIST    Prior to Admission medications    Medication Sig Start Date End Date Taking? Authorizing Provider   albuterol 90 mcg/actuation inhaler Inhale 1-2 puffs every 6 hours if needed.   Yes Historical Provider, MD   albuterol 2.5 mg /3 mL (0.083 %) nebulizer solution Take 3 mL (2.5 mg) by nebulization if needed for wheezing.    Historical Provider, MD   apixaban (Eliquis) 5 mg tablet Take 1 tablet (5 mg) by mouth 2 times a day.    Historical Provider, MD   atorvastatin (Lipitor) 40 mg tablet Take 1 tablet (40 mg) by mouth once daily.    Historical Provider, MD   carvedilol (Coreg) 6.25 mg tablet Take 1 tablet (6.25 mg) by mouth 2 times a day.    Historical Provider, MD   cholecalciferol (Vitamin D-3) 25 MCG (1000 UT) capsule Take 1 capsule (25 mcg) by mouth once daily.    Historical Provider, MD   clopidogrel (Plavix) 75 mg tablet Take 1 tablet (75 mg) by mouth once daily.    Historical Provider, MD   cyanocobalamin (Vitamin B-12) 1,000 mcg tablet Take 1 tablet (1,000 mcg) by mouth once daily.    Historical Provider, MD   empagliflozin (Jardiance) 25 mg Take 0.5 tablets (12.5 mg) by mouth once daily.    Historical Provider, MD   sacubitriL-valsartan (Entresto) 24-26 mg tablet Take 1 tablet by mouth 2 times a day.    Historical Provider, MD   spironolactone (Aldactone) 25 mg tablet Take 0.5 tablets (12.5 mg) by mouth once daily.    Historical Provider, MD        The list below reflectives the updated allergy list. Please review  each documented allergy for additional clarification and justification.  Allergies  Reviewed by Aurora Horowitz RN on 6/11/2024        Severity Reactions Comments    Effexor [venlafaxine] Not Specified Unknown [facility]    Baclofen Low Dizziness, Nausea/vomiting From [facility]              Mar Pridemore

## 2024-06-11 NOTE — ED PROVIDER NOTES
"HPI   Chief Complaint   Patient presents with    Chest Pain    Shortness of Breath    Syncope     [facility] pt states he has chest pain and increased SOB when lying flat, denies cp and sob while sitting up at this time. Pt states he had syncopal episode 2 days ago while sitting on edge of bed, stating he felt lightheaded then fell on the floor, head landing on a bag that was on the floor- denies complaints from fall at this time. Pt states he has loose pacemaker wires per [facility] staff. Pt denies pain at this time.        [facility] \"loose pacemaker.\"    Patient states that he had a syncopal episode 2 days ago.  States that he was sitting on the side of his bed when he felt lightheaded and fell forward.  He did strike his face.  He does think that he briefly lost consciousness.  He denies headache, vision changes, nausea, vomiting, and focal numbness/weakness since the fall.  Does state that he is on Eliquis.    Has also noted intermittent chest pain over the past 2 days.  States that it is worse at night when he is laying flat.  He does also note shortness of breath at night when he is lying flat.  He denies fevers, chills, productive cough, exertional chest pain, abdominal pain, lower extremity swelling.  He is not currently experiencing chest pain or shortness of breath.    Patient does note a significant cardiovascular history, including CAD status post multiple stents, status post permanent pacemaker placement, and status post aortic repair.      History provided by:  Patient   used: No                        Jacquie Coma Scale Score: 15                     Patient History   No past medical history on file.  No past surgical history on file.  No family history on file.  Social History     Tobacco Use    Smoking status: Not on file    Smokeless tobacco: Not on file   Substance Use Topics    Alcohol use: Not on file    Drug use: Not on file "       Physical Exam   ED Triage Vitals [06/11/24 1535]   Temperature Heart Rate Respirations BP   36.4 °C (97.5 °F) 59 20 120/58      Pulse Ox Temp Source Heart Rate Source Patient Position   97 % Temporal Monitor Sitting      BP Location FiO2 (%)     Right arm --       Physical Exam  Vitals and nursing note reviewed.   HENT:      Head: Atraumatic.      Mouth/Throat:      Mouth: Mucous membranes are moist.   Eyes:      Conjunctiva/sclera: Conjunctivae normal.   Cardiovascular:      Rate and Rhythm: Normal rate and regular rhythm.      Pulses: Normal pulses.      Comments: There is no pitting lower extremity edema or erythema.  Pulmonary:      Effort: Pulmonary effort is normal.      Breath sounds: Normal breath sounds.   Abdominal:      Palpations: Abdomen is soft.      Tenderness: There is no abdominal tenderness.   Musculoskeletal:         General: No deformity.      Cervical back: Normal range of motion.   Skin:     General: Skin is warm and dry.   Neurological:      Mental Status: He is alert.      Comments: Moving all extremities         ED Course & [facility]   ED Course as of 06/11/24 1749 Tue Jun 11, 2024   1638 CT head W O contrast trauma protocol  Per my view, there is no obvious intracranial bleed [AB]   1638 XR chest 1 view  Per my view, there is no obvious widening of the mediastinum or calcium sign [AB]   1643 XR chest 1 view  Pacemaker used to attempt to ID the patient's pacemaker.  Unfortunately, this was not identified as [employer], [employer], [employer], or [employer]. [AB]   1748 Spoke with Pat with [employer] -- non-sustained run of VT on June 8th.  Some episodes of atrial tachycardia. [AB]      ED Course User Index  [AB] Joesph Cazares MD         Diagnoses as of 06/11/24 1749   Syncope, unspecified syncope type   Acute on chronic congestive heart failure, unspecified heart failure type (Multi)       Medical Decision Making  As best I can tell, patient has a past medical history of CAD status  post multiple stents, status post permanent pacemaker placement, status post aorta repair presents with what sounds like multiple complaints.    It sounds like he initially had a syncopal episode 2 days ago with trauma to the head.  Will certainly investigate etiology of syncope in the setting of his cardiac history as well as obtain trauma workup, given head trauma while on Eliquis.  [facility] paged after my initial evaluation.    Patient also reporting chest pain with syncope.  Will evaluate aorta repair in addition.    Given report of orthopnea, do suspect an element of CHF exacerbation.  Given run of nonsustained VT reported by [employer] representative, concern for cardiac etiology of syncope as well.  Will escalate care to hospitalization for further management.    Will also evaluate for CHF exacerbation, given reported orthopnea with significant cardiac this history.    Amount and/or Complexity of Data Reviewed  Independent Historian: EMS  Labs: ordered.  Radiology: ordered and independent interpretation performed. Decision-making details documented in ED Course.  ECG/medicine tests: ordered and independent interpretation performed.     Details: My ECG interpretation: Paced rhythm, heart rate 60, QTc 468  Discussion of management or test interpretation with external provider(s): The admitting medicine physician    Risk  Decision regarding hospitalization.        Procedure  Procedures     Joesph Cazares MD  06/12/24 1935

## 2024-06-12 ENCOUNTER — APPOINTMENT (OUTPATIENT)
Dept: VASCULAR MEDICINE | Facility: HOSPITAL | Age: 74
End: 2024-06-12
Payer: COMMERCIAL

## 2024-06-12 ENCOUNTER — APPOINTMENT (OUTPATIENT)
Dept: CARDIOLOGY | Facility: HOSPITAL | Age: 74
End: 2024-06-12
Payer: COMMERCIAL

## 2024-06-12 PROBLEM — I71.40 ABDOMINAL AORTIC ANEURYSM (AAA) WITHOUT RUPTURE (CMS-HCC): Status: ACTIVE | Noted: 2024-06-12

## 2024-06-12 PROBLEM — F17.219 CIGARETTE NICOTINE DEPENDENCE WITH NICOTINE-INDUCED DISORDER: Status: ACTIVE | Noted: 2024-06-12

## 2024-06-12 PROBLEM — I77.819 ECTATIC AORTA (CMS-HCC): Status: ACTIVE | Noted: 2024-06-12

## 2024-06-12 LAB
ANION GAP SERPL CALC-SCNC: 9 MMOL/L (ref 10–20)
AORTIC VALVE PEAK VELOCITY: 1.03 M/S
ATRIAL RATE: 60 BPM
AV PEAK GRADIENT: 4.2 MMHG
AVA (PEAK VEL): 3.04 CM2
BUN SERPL-MCNC: 20 MG/DL (ref 6–23)
CALCIUM SERPL-MCNC: 8.4 MG/DL (ref 8.6–10.3)
CHLORIDE SERPL-SCNC: 106 MMOL/L (ref 98–107)
CHOLEST SERPL-MCNC: 160 MG/DL (ref 0–199)
CHOLESTEROL/HDL RATIO: 5.8
CO2 SERPL-SCNC: 29 MMOL/L (ref 21–32)
CREAT SERPL-MCNC: 0.72 MG/DL (ref 0.5–1.3)
EGFRCR SERPLBLD CKD-EPI 2021: >90 ML/MIN/1.73M*2
EJECTION FRACTION APICAL 4 CHAMBER: 42.6
ERYTHROCYTE [DISTWIDTH] IN BLOOD BY AUTOMATED COUNT: 14.1 % (ref 11.5–14.5)
GLOBAL LONGITUDINAL STRAIN: 9.3 %
GLUCOSE SERPL-MCNC: 195 MG/DL (ref 74–99)
HCT VFR BLD AUTO: 44.9 % (ref 41–52)
HDLC SERPL-MCNC: 27.6 MG/DL
HGB BLD-MCNC: 13.9 G/DL (ref 13.5–17.5)
LDLC SERPL CALC-MCNC: 119 MG/DL
LEFT VENTRICLE INTERNAL DIMENSION DIASTOLE: 5.8 CM (ref 3.5–6)
LEFT VENTRICULAR OUTFLOW TRACT DIAMETER: 2 CM
LV EJECTION FRACTION BIPLANE: 37 %
MCH RBC QN AUTO: 29 PG (ref 26–34)
MCHC RBC AUTO-ENTMCNC: 31 G/DL (ref 32–36)
MCV RBC AUTO: 94 FL (ref 80–100)
MITRAL VALVE E/A RATIO: 0.45
NON HDL CHOLESTEROL: 132 MG/DL (ref 0–149)
NRBC BLD-RTO: 0 /100 WBCS (ref 0–0)
P AXIS: 63 DEGREES
PLATELET # BLD AUTO: 231 X10*3/UL (ref 150–450)
POTASSIUM SERPL-SCNC: 4.6 MMOL/L (ref 3.5–5.3)
PR INTERVAL: 209 MS
Q ONSET: 251 MS
QRS COUNT: 10 BEATS
QRS DURATION: 147 MS
QT INTERVAL: 468 MS
QTC CALCULATION(BAZETT): 468 MS
QTC FREDERICIA: 468 MS
R AXIS: 96 DEGREES
RBC # BLD AUTO: 4.8 X10*6/UL (ref 4.5–5.9)
RIGHT VENTRICLE FREE WALL PEAK S': 7.72 CM/S
RIGHT VENTRICLE PEAK SYSTOLIC PRESSURE: 25.5 MMHG
SODIUM SERPL-SCNC: 139 MMOL/L (ref 136–145)
T AXIS: -74 DEGREES
T OFFSET: 485 MS
TRICUSPID ANNULAR PLANE SYSTOLIC EXCURSION: 1.9 CM
TRIGL SERPL-MCNC: 68 MG/DL (ref 0–149)
VENTRICULAR RATE: 60 BPM
VLDL: 14 MG/DL (ref 0–40)
WBC # BLD AUTO: 5.7 X10*3/UL (ref 4.4–11.3)

## 2024-06-12 PROCEDURE — 2500000004 HC RX 250 GENERAL PHARMACY W/ HCPCS (ALT 636 FOR OP/ED): Performed by: NURSE PRACTITIONER

## 2024-06-12 PROCEDURE — 94640 AIRWAY INHALATION TREATMENT: CPT | Mod: MUE

## 2024-06-12 PROCEDURE — 80061 LIPID PANEL: CPT | Performed by: NURSE PRACTITIONER

## 2024-06-12 PROCEDURE — 80048 BASIC METABOLIC PNL TOTAL CA: CPT | Performed by: NURSE PRACTITIONER

## 2024-06-12 PROCEDURE — 2500000004 HC RX 250 GENERAL PHARMACY W/ HCPCS (ALT 636 FOR OP/ED)

## 2024-06-12 PROCEDURE — 2500000001 HC RX 250 WO HCPCS SELF ADMINISTERED DRUGS (ALT 637 FOR MEDICARE OP): Performed by: NURSE PRACTITIONER

## 2024-06-12 PROCEDURE — 36415 COLL VENOUS BLD VENIPUNCTURE: CPT | Performed by: NURSE PRACTITIONER

## 2024-06-12 PROCEDURE — 9420000001 HC RT PATIENT EDUCATION 5 MIN

## 2024-06-12 PROCEDURE — 93880 EXTRACRANIAL BILAT STUDY: CPT

## 2024-06-12 PROCEDURE — 93356 MYOCRD STRAIN IMG SPCKL TRCK: CPT | Performed by: STUDENT IN AN ORGANIZED HEALTH CARE EDUCATION/TRAINING PROGRAM

## 2024-06-12 PROCEDURE — 85027 COMPLETE CBC AUTOMATED: CPT | Performed by: NURSE PRACTITIONER

## 2024-06-12 PROCEDURE — 99222 1ST HOSP IP/OBS MODERATE 55: CPT | Performed by: NURSE PRACTITIONER

## 2024-06-12 PROCEDURE — 2500000001 HC RX 250 WO HCPCS SELF ADMINISTERED DRUGS (ALT 637 FOR MEDICARE OP)

## 2024-06-12 PROCEDURE — 1200000002 HC GENERAL ROOM WITH TELEMETRY DAILY

## 2024-06-12 PROCEDURE — 93356 MYOCRD STRAIN IMG SPCKL TRCK: CPT

## 2024-06-12 PROCEDURE — 93306 TTE W/DOPPLER COMPLETE: CPT | Performed by: STUDENT IN AN ORGANIZED HEALTH CARE EDUCATION/TRAINING PROGRAM

## 2024-06-12 PROCEDURE — 99223 1ST HOSP IP/OBS HIGH 75: CPT

## 2024-06-12 PROCEDURE — 99223 1ST HOSP IP/OBS HIGH 75: CPT | Performed by: PSYCHIATRY & NEUROLOGY

## 2024-06-12 PROCEDURE — 93880 EXTRACRANIAL BILAT STUDY: CPT | Performed by: SURGERY

## 2024-06-12 PROCEDURE — 2500000002 HC RX 250 W HCPCS SELF ADMINISTERED DRUGS (ALT 637 FOR MEDICARE OP, ALT 636 FOR OP/ED): Performed by: INTERNAL MEDICINE

## 2024-06-12 RX ORDER — CEFTRIAXONE 1 G/50ML
1 INJECTION, SOLUTION INTRAVENOUS EVERY 24 HOURS
Status: DISCONTINUED | OUTPATIENT
Start: 2024-06-12 | End: 2024-06-12

## 2024-06-12 RX ORDER — BENZONATATE 100 MG/1
100 CAPSULE ORAL 3 TIMES DAILY PRN
Status: DISCONTINUED | OUTPATIENT
Start: 2024-06-12 | End: 2024-06-14 | Stop reason: HOSPADM

## 2024-06-12 RX ORDER — ALBUTEROL SULFATE 0.83 MG/ML
2.5 SOLUTION RESPIRATORY (INHALATION) 3 TIMES DAILY
Status: DISCONTINUED | OUTPATIENT
Start: 2024-06-12 | End: 2024-06-14 | Stop reason: HOSPADM

## 2024-06-12 RX ADMIN — SACUBITRIL AND VALSARTAN 1 TABLET: 24; 26 TABLET, FILM COATED ORAL at 12:51

## 2024-06-12 RX ADMIN — CARVEDILOL 6.25 MG: 3.12 TABLET, FILM COATED ORAL at 09:47

## 2024-06-12 RX ADMIN — ALBUTEROL SULFATE 2.5 MG: 2.5 SOLUTION RESPIRATORY (INHALATION) at 12:20

## 2024-06-12 RX ADMIN — CEFTRIAXONE SODIUM 1 G: 1 INJECTION, SOLUTION INTRAVENOUS at 05:01

## 2024-06-12 RX ADMIN — APIXABAN 5 MG: 5 TABLET, FILM COATED ORAL at 09:47

## 2024-06-12 RX ADMIN — DOXYCYCLINE 100 MG: 100 INJECTION, POWDER, LYOPHILIZED, FOR SOLUTION INTRAVENOUS at 23:43

## 2024-06-12 RX ADMIN — DOXYCYCLINE 100 MG: 100 INJECTION, POWDER, LYOPHILIZED, FOR SOLUTION INTRAVENOUS at 14:22

## 2024-06-12 RX ADMIN — AZITHROMYCIN 500 MG: 500 INJECTION, POWDER, LYOPHILIZED, FOR SOLUTION INTRAVENOUS at 00:01

## 2024-06-12 RX ADMIN — CARVEDILOL 6.25 MG: 3.12 TABLET, FILM COATED ORAL at 21:38

## 2024-06-12 RX ADMIN — Medication 1000 UNITS: at 09:48

## 2024-06-12 RX ADMIN — BENZONATATE 100 MG: 100 CAPSULE ORAL at 05:01

## 2024-06-12 RX ADMIN — ALBUTEROL SULFATE 2.5 MG: 2.5 SOLUTION RESPIRATORY (INHALATION) at 19:44

## 2024-06-12 RX ADMIN — CLOPIDOGREL 75 MG: 75 TABLET ORAL at 09:47

## 2024-06-12 RX ADMIN — METHYLPREDNISOLONE SODIUM SUCCINATE 40 MG: 40 INJECTION, POWDER, FOR SOLUTION INTRAMUSCULAR; INTRAVENOUS at 14:22

## 2024-06-12 RX ADMIN — SACUBITRIL AND VALSARTAN 1 TABLET: 24; 26 TABLET, FILM COATED ORAL at 21:38

## 2024-06-12 RX ADMIN — METHYLPREDNISOLONE SODIUM SUCCINATE 40 MG: 40 INJECTION, POWDER, FOR SOLUTION INTRAMUSCULAR; INTRAVENOUS at 05:01

## 2024-06-12 RX ADMIN — EMPAGLIFLOZIN 12.5 MG: 10 TABLET, FILM COATED ORAL at 09:45

## 2024-06-12 RX ADMIN — CYANOCOBALAMIN TAB 1000 MCG 1000 MCG: 1000 TAB at 12:51

## 2024-06-12 ASSESSMENT — COGNITIVE AND FUNCTIONAL STATUS - GENERAL
MOBILITY SCORE: 24
DAILY ACTIVITIY SCORE: 24

## 2024-06-12 ASSESSMENT — PAIN - FUNCTIONAL ASSESSMENT
PAIN_FUNCTIONAL_ASSESSMENT: 0-10
PAIN_FUNCTIONAL_ASSESSMENT: 0-10

## 2024-06-12 ASSESSMENT — PAIN SCALES - GENERAL
PAINLEVEL_OUTOF10: 0 - NO PAIN
PAINLEVEL_OUTOF10: 0 - NO PAIN

## 2024-06-12 NOTE — NURSING NOTE
06/12/24  0915  Patient Navigator   I introduced myself to the patient and explained my role. Pt states he lives alone and has the help of his brother if needed. He admits that he smokes cigarettes however has decreased the quantity. I did  him on smoking cessation. He doesn't do much exercise but does states he is active I have updated him on the recommendations of 30 min exercise 3 times a week. He states he doesn't cook often at home and eats out frequently. We discussed food options and reviewed the handouts listed below. In addition, I reviewed the Stroke Folder. I reviewed the following lab values and what they indicate: cholesterol, HDL, LDL, & triglycerides. I gave the patient my business card & instructed him to call me as needed. He appreciated my visit and denied any questions or needs. Please see the Education tab for additional information. I have updated the patient's RN of my visit.    Handouts:  What can I eat? American Diabetes Association  Understanding smoking & stroke- World Stroke Organization  How do I follow a healthy diet pattern? American Heart Association  Lifestyle changes to prevent a stroke- American Stroke Association  The connection between diabetes & stroke- American Stroke Association      Sonam SCHULTE, RN  Patient Navigator  Stroke Educator  Diabetes Care &

## 2024-06-12 NOTE — CONSULTS
Consults    Reason For Consult  COPD exacerbation    History Of Present Illness  Osmany Michele is a 73 y.o. male with a past medical history of CAD s/p multiple stents, SSS s/p PPM, s/p aortic repair/graft, nicotine use disorder, COPD, initially presented hospital chest pain and syncope.  Patient states that he believes he was COPD causing his symptoms and not chest pain.  He is at increased utilization of his rescue inhaler or nebulizers at home over the past several days.  He also has a cough that is nonproductive along orthopnea and wheezing.  2 nights ago he did have a syncopal episode.  CXR findings shows no evidence of consolidation or pleural effusion.  There is a new dense material in the right lung base that may represent pleural calcifications.  CT angio chest still shows no PE.  There are some calcifications located in the right lung base however.  He has also had increased smoking cigarettes over the last couple weeks.  He was previously on 1 cigarette a week however is now smoking half a pack a day.  He denies any fever or chills.     Past Medical History  He has no past medical history on file.    Surgical History  He has no past surgical history on file.     Social History  He has no history on file for tobacco use, alcohol use, and drug use.    Family History  No family history on file.     Allergies  Azithromycin, Effexor [venlafaxine], and Baclofen    Review of Systems  A 12 point review of systems was performed and otherwise negative except as stated in the HPI.      Physical Exam  General:  Pleasant and cooperative. No apparent distress.  HEENT:  Normocephalic, atraumatic, mucus membranes moist.   Neck:  Trachea midline.  No JVD.    Chest: Mild bilateral wheezing present.  No crackles.  CV:  Regular rate and rhythm.  Positive S1/S2.   Abdomen: Bowel sounds present in all four quadrants, abdomen is soft, non-tender, non-distended.  Extremities:  No lower extremity edema or cyanosis.    Neurological:  AAOx3. No focal deficits.  Skin:  Warm and dry.        Last Recorded Vitals  /66 (BP Location: Left arm, Patient Position: Standing)   Pulse 72   Temp 36.5 °C (97.7 °F) (Temporal)   Resp 18   Wt 78 kg (172 lb)   SpO2 91%     Relevant Results  All labs and imaging reviewed by myself.     Assessment/Plan   Osmany Kaufman is a 73-year-old male with significant past med history of COPD presenting to hospital with several day history of shortness of breath and nonproductive cough.  Send chest x-ray shows no consolidation or pleural effusion however there is new right lung base pleural calcification.    # COPD exacerbation  - This is a patient with significant history of COPD and is a chronic smoker presenting with a COPD exacerbation.  CXR shows no signs of any pneumonia.  We advised to continue management with submental oxygen, IV azithromycin, nebulizers as needed and scheduled.  - We advised to switch IV Solu-Medrol to once daily.  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  He also had his carpets changed a year ago which creates a lot of dust.  The right-sided pleural calcifications are benign findings breast ptosis.  There are no other signs of any malignancy.  - We advised patient to follow-up in outpatient pulmonology in 6 to 8 weeks.    Savita Sher MD  PGY1 pulmonology

## 2024-06-12 NOTE — PROGRESS NOTES
"   06/12/24 1106   Discharge Planning   Living Arrangements Alone   Support Systems Family members   Type of Residence Homeless   Do you have animals or pets at home? No   Who is requesting discharge planning? Provider   Patient expects to be discharged to: home     Met with pt this am, introduced myself and role.  Pt admit for COPD, pt has [facility] will call this institution and will fax clinicals.  Per pt he left his \"[address]\" apartment and has no living arrangements except for his sister in [place], [place], per pt he plans on going to her home until he finds housing.   Jossie Wang RN T[facility]    [facility] and will [facility]s to [phone].  Jossie Wang RN TCC      "

## 2024-06-12 NOTE — CONSULTS
Inpatient consult to Cardiology  Consult performed by: Anthony Mendez DO  Consult ordered by: Fernando Isaac DO          Reason For Consult  Syncope     History Of Present Illness  Osmany Michele is a 73 y.o. male with medical history significant for CAD status post multiple stents, sick sinus syndrome status post PPM, status post aortic repair/graft, nicotine use disorder, COPD who initially presented to [facility] due to reported chest pain and syncope.  Patient noted he believes his chest pain was likely secondary to his cough that he was having and also noted he was having increased use of his rescue inhaler and nebulizer at home because of the cough which is why he believes he had chest pain.  In addition, patient also noted that he had a syncopal episode where he did admit that he lost consciousness and hit his head.  Patient does not know why he had a syncopal episode he said that he was sitting at the edge of his bed changing and then suddenly felt very dizzy and next thing he knew he was on the floor.  At time of syncopal episode, patient denied any chest pain, diaphoresis and did not think his ICD device fired.  Cardiology team consulted for interrogation of his ICD device.  1.        Past Medical History  He has no past medical history on file.    Surgical History  He has no past surgical history on file.     Social History  He has no history on file for tobacco use, alcohol use, and drug use.    Family History  No family history on file.     Allergies  Azithromycin, Effexor [venlafaxine], and Baclofen    Review of Systems  10 point ROS negfative aside from HPI       Physical Exam  General:  Pleasant and cooperative. No apparent distress.  HEENT:  Normocephalic, atraumatic, mucus membranes moist.   Neck:  Trachea midline.  No JVD.    Chest: Mild bilateral wheezing present.  No crackles.  CV:  Regular rate and rhythm.  Positive S1/S2.   Abdomen: Bowel sounds present in all four quadrants, abdomen is soft,  non-tender, non-distended.  Extremities:  No lower extremity edema or cyanosis.   Neurological:  AAOx3. No focal deficits.  Skin:  Warm and dry.        Last Recorded Vitals  /66 (BP Location: Left arm, Patient Position: Standing)   Pulse 72   Temp 36.5 °C (97.7 °F) (Temporal)   Resp 18   Wt 78 kg (172 lb)   SpO2 93%     Relevant Results  Recent Results (from the past 12 hour(s))   Lipid Panel    Collection Time: 06/12/24  4:52 AM   Result Value Ref Range    Cholesterol 160 0 - 199 mg/dL    HDL-Cholesterol 27.6 mg/dL    Cholesterol/HDL Ratio 5.8     LDL Calculated 119 (H) <=99 mg/dL    VLDL 14 0 - 40 mg/dL    Triglycerides 68 0 - 149 mg/dL    Non HDL Cholesterol 132 0 - 149 mg/dL   CBC    Collection Time: 06/12/24  4:52 AM   Result Value Ref Range    WBC 5.7 4.4 - 11.3 x10*3/uL    nRBC 0.0 0.0 - 0.0 /100 WBCs    RBC 4.80 4.50 - 5.90 x10*6/uL    Hemoglobin 13.9 13.5 - 17.5 g/dL    Hematocrit 44.9 41.0 - 52.0 %    MCV 94 80 - 100 fL    MCH 29.0 26.0 - 34.0 pg    MCHC 31.0 (L) 32.0 - 36.0 g/dL    RDW 14.1 11.5 - 14.5 %    Platelets 231 150 - 450 x10*3/uL   Basic Metabolic Panel    Collection Time: 06/12/24  4:52 AM   Result Value Ref Range    Glucose 195 (H) 74 - 99 mg/dL    Sodium 139 136 - 145 mmol/L    Potassium 4.6 3.5 - 5.3 mmol/L    Chloride 106 98 - 107 mmol/L    Bicarbonate 29 21 - 32 mmol/L    Anion Gap 9 (L) 10 - 20 mmol/L    Urea Nitrogen 20 6 - 23 mg/dL    Creatinine 0.72 0.50 - 1.30 mg/dL    eGFR >90 >60 mL/min/1.73m*2    Calcium 8.4 (L) 8.6 - 10.3 mg/dL   Carotid duplex bilateral    Collection Time: 06/12/24  8:34 AM   Result Value Ref Range    BSA 2 m2   Transthoracic Echo (TTE) Complete    Collection Time: 06/12/24  8:56 AM   Result Value Ref Range    AV pk darshana 1.03 m/s    LVOT diam 2.00 cm    LV Biplane EF 37 %    MV E/A ratio 0.45     Tricuspid annular plane systolic excursion 1.9 cm    RV free wall pk S' 7.72 cm/s    LV GLS 9.3 %    LVIDd 5.80 cm    RVSP 25.5 mmHg    Aortic Valve Area by  Continuity of Peak Velocity 3.04 cm2    AV pk grad 4.2 mmHg    LV A4C EF 42.6          Assessment/Plan   Episode of VT followed by syncope.  Patient's ICD device was interrogated and showed that he had a run of VT shortly before he had a syncopal episode.  Will plan for cardiac catheterization tomorrow to evaluate his coronaries and need for stent placement.  CAD status post multiple stents.  Hold anticoagulation and plan for cardiac catheterization tomorrow.  Nicotine use.  Encourage patient on tobacco cessation as this is contributing to his cardiopulmonary disease.    Anthony Mendez DO

## 2024-06-12 NOTE — CARE PLAN
The patient's goals for the shift include        Problem: Respiratory  Goal: Clear secretions with interventions this shift  Outcome: Progressing  Goal: Minimize anxiety/maximize coping throughout shift  Outcome: Progressing     Problem: Fall/Injury  Goal: Not fall by end of shift  Outcome: Progressing  Goal: Be free from injury by end of the shift  Outcome: Progressing  Goal: Verbalize understanding of personal risk factors for fall in the hospital  Outcome: Progressing

## 2024-06-12 NOTE — CONSULTS
Consults   Vascular Surgery  Reason For Consult   abnormal imaging Hx of infrarenal aortobiiliac stent grafts    History Of Present Illness  Osmany Michele is a 73 y.o. male presenting with  past medical history of CAD s/p multiple stents, SSS s/p PPM, s/p aortic repair/graft, nicotine use disorder, and COPD.  [facility] reported chest pain and syncope.  Patient states that he told the office that he had difficult time sleeping the night prior and was having chest pain and felt that it was his COPD causing him issues.  He does endorse recent increased utilization of rescue inhaler and nebulizers at home the past several days.  Reports cough, orthopnea, and wheezing.  2 nights ago he additionally had a syncopal episode.  Reports that he was sitting on the side of the bed, when he stood up he became lightheaded and passed out.  States he fell forward.  Follow-up broken by bags on the floor, however he does endorse hitting his head.  Immediately regained consciousness. He denies any associated injuries.     ER course: Hemodynamically stable, mildly tachypneic 20-30.  EKG showed atrial paced rhythm, no recent EKG for comparison.  CBC unremarkable.  Glucose 114, CMP normal otherwise.  .  High-sensitivity troponin 9, repeat 6.  CT head without acute findings.  Small geographic area of cystic encephalomalacia and gliosis present in the right occipital lobe likely representing sequela of prior infarct/injury.  CT C-spine without acute trauma.  CT angio chest without acute pathology. infrarenal aortobiiliac stent graft without aneurysm or endoleak..  Presently moderate stenosis of the left external iliac artery.  Left lateral saccular outpouching at the level of the diaphragm short in length where the aorta measures 32 mm in caliber.  Atherosclerotic changes with few areas of small penetrating atherosclerotic ulcers in the thoracic aortic arch and descending thoracic aortic.   Dilated loops of the jejunum in the left abdomen.  Age-indeterminate L2 and L4 vertebral body compression deformities.   Limited records in EMR for comparison   Past medical history: As above  Past surgical history: As above; additional history of left hip fracture repair  Social history: Current smoker, trying to quit.  Denies alcohol abuse or illicit drug use.  [facility]  Family history: Heart disease.   I evaluated the patient at bedside and obtain information from chart review and patient interview.  Patient had a infrarenal abdominal aortic aneurysm repair with bifurcated stent [facility] [place].  Patient sees a vascular surgeon for follow-up in the [facility].   .     Past Medical History  He has no past medical history on file.    Surgical History  He has no past surgical history on file.     Social History  He has no history on file for tobacco use, alcohol use, and drug use.    Family History  No family history on file.     Allergies  Azithromycin, Effexor [venlafaxine], and Baclofen    Review of Systems  A 10 point ROS was performed with the patient denying any complaint at this time aside from those listed in the HPI above.     Physical Exam     Constitutional: Well developed , awake/alert/oriented x3, in no distress,  Eyes: Clear sclera  ENMT: mucous membranes are moist, no apparent injury, no lesions seen,   Head/neck: Neck supple, trachea  is midline, no apparent injury, no bruits, no mass, no stridor  Respiratory/thorax: Breath sounds clear and equal bilaterally very diminished breath sounds throughout, thorax symmetric  Cardiac/Vascular: Regular, rate and rhythm, no murmurs, 2+ radial pulses, palpable bilateral femorals, popliteals, strong bilateral posterior tib's, dopplerable left DP signal  Gastrointestinal: Nondistended soft nontender, positive bowel sounds, no bruits. Negative for pulsatile mass  Musculoskeletal: Moves all extremities, limited range  "of motion , no joint swelling,   Extremities: No cyanosis, no contusions or wounds,   Neurological: Alert and oriented x3,   Lymphatic: No significant lymphadenopathy  Skin: Warm and dry, no lesions, no rashes  Psychological: Appropriate mood and behavior  Last Recorded Vitals  Blood pressure 121/66, pulse 72, temperature 36.5 °C (97.7 °F), temperature source Temporal, resp. rate 18, height 1.854 m (6' 1\"), weight 78 kg (172 lb), SpO2 93%.       Results for orders placed or performed during the hospital encounter of 06/11/24 (from the past 24 hour(s))   CBC and Auto Differential   Result Value Ref Range    WBC 6.1 4.4 - 11.3 x10*3/uL    nRBC 0.0 0.0 - 0.0 /100 WBCs    RBC 4.81 4.50 - 5.90 x10*6/uL    Hemoglobin 14.3 13.5 - 17.5 g/dL    Hematocrit 44.4 41.0 - 52.0 %    MCV 92 80 - 100 fL    MCH 29.7 26.0 - 34.0 pg    MCHC 32.2 32.0 - 36.0 g/dL    RDW 14.1 11.5 - 14.5 %    Platelets 266 150 - 450 x10*3/uL    Neutrophils % 59.6 40.0 - 80.0 %    Immature Granulocytes %, Automated 0.3 0.0 - 0.9 %    Lymphocytes % 27.7 13.0 - 44.0 %    Monocytes % 9.1 2.0 - 10.0 %    Eosinophils % 2.3 0.0 - 6.0 %    Basophils % 1.0 0.0 - 2.0 %    Neutrophils Absolute 3.66 1.60 - 5.50 x10*3/uL    Immature Granulocytes Absolute, Automated 0.02 0.00 - 0.50 x10*3/uL    Lymphocytes Absolute 1.70 0.80 - 3.00 x10*3/uL    Monocytes Absolute 0.56 0.05 - 0.80 x10*3/uL    Eosinophils Absolute 0.14 0.00 - 0.40 x10*3/uL    Basophils Absolute 0.06 0.00 - 0.10 x10*3/uL   Comprehensive Metabolic Panel   Result Value Ref Range    Glucose 114 (H) 74 - 99 mg/dL    Sodium 139 136 - 145 mmol/L    Potassium 4.3 3.5 - 5.3 mmol/L    Chloride 106 98 - 107 mmol/L    Bicarbonate 27 21 - 32 mmol/L    Anion Gap 10 10 - 20 mmol/L    Urea Nitrogen 19 6 - 23 mg/dL    Creatinine 0.70 0.50 - 1.30 mg/dL    eGFR >90 >60 mL/min/1.73m*2    Calcium 8.9 8.6 - 10.3 mg/dL    Albumin 4.0 3.4 - 5.0 g/dL    Alkaline Phosphatase 51 33 - 136 U/L    Total Protein 6.5 6.4 - 8.2 g/dL    " AST 15 9 - 39 U/L    Bilirubin, Total 0.5 0.0 - 1.2 mg/dL    ALT 15 10 - 52 U/L   Magnesium   Result Value Ref Range    Magnesium 2.06 1.60 - 2.40 mg/dL   B-Type Natriuretic Peptide   Result Value Ref Range     (H) 0 - 99 pg/mL   Troponin I, High Sensitivity, Initial   Result Value Ref Range    Troponin I, High Sensitivity 9 0 - 20 ng/L   Troponin, High Sensitivity, 1 Hour   Result Value Ref Range    Troponin I, High Sensitivity 6 0 - 20 ng/L   ECG 12 lead   Result Value Ref Range    Ventricular Rate 60 BPM    Atrial Rate 60 BPM    MS Interval 209 ms    QRS Duration 147 ms    QT Interval 468 ms    QTC Calculation(Bazett) 468 ms    P Axis 63 degrees    R Axis 96 degrees    T Axis -74 degrees    QRS Count 10 beats    Q Onset 251 ms    T Offset 485 ms    QTC Fredericia 468 ms   Lipid Panel   Result Value Ref Range    Cholesterol 160 0 - 199 mg/dL    HDL-Cholesterol 27.6 mg/dL    Cholesterol/HDL Ratio 5.8     LDL Calculated 119 (H) <=99 mg/dL    VLDL 14 0 - 40 mg/dL    Triglycerides 68 0 - 149 mg/dL    Non HDL Cholesterol 132 0 - 149 mg/dL   CBC   Result Value Ref Range    WBC 5.7 4.4 - 11.3 x10*3/uL    nRBC 0.0 0.0 - 0.0 /100 WBCs    RBC 4.80 4.50 - 5.90 x10*6/uL    Hemoglobin 13.9 13.5 - 17.5 g/dL    Hematocrit 44.9 41.0 - 52.0 %    MCV 94 80 - 100 fL    MCH 29.0 26.0 - 34.0 pg    MCHC 31.0 (L) 32.0 - 36.0 g/dL    RDW 14.1 11.5 - 14.5 %    Platelets 231 150 - 450 x10*3/uL   Basic Metabolic Panel   Result Value Ref Range    Glucose 195 (H) 74 - 99 mg/dL    Sodium 139 136 - 145 mmol/L    Potassium 4.6 3.5 - 5.3 mmol/L    Chloride 106 98 - 107 mmol/L    Bicarbonate 29 21 - 32 mmol/L    Anion Gap 9 (L) 10 - 20 mmol/L    Urea Nitrogen 20 6 - 23 mg/dL    Creatinine 0.72 0.50 - 1.30 mg/dL    eGFR >90 >60 mL/min/1.73m*2    Calcium 8.4 (L) 8.6 - 10.3 mg/dL   Carotid duplex bilateral   Result Value Ref Range    BSA 2 m2   Transthoracic Echo (TTE) Complete   Result Value Ref Range    AV pk darshana 1.03 m/s    LVOT diam 2.00  cm    LV Biplane EF 37 %    MV E/A ratio 0.45     Tricuspid annular plane systolic excursion 1.9 cm    RV free wall pk S' 7.72 cm/s    LV GLS 9.3 %    LVIDd 5.80 cm    RVSP 25.5 mmHg    Aortic Valve Area by Continuity of Peak Velocity 3.04 cm2    AV pk grad 4.2 mmHg    LV A4C EF 42.6        Transthoracic Echo (TTE) Complete    Result Date: 6/12/2024    [facility], [address] [place] [id number] [phone] and                                 Fax [phone] TRANSTHORACIC ECHOCARDIOGRAM REPORT  Patient Name:      EMILY ROSS     Reading Physician:    [id number] Jeronimo Moe MD Study Date:        6/12/2024            Ordering Provider:    [id number] JAVIER GARNER MRN/PID:           [id number]             Fellow: Accession#:        [id number]         Nurse: Date of Birth/Age: 1950 / 73      Sonographer:          Faisal Lovelace RDCS                    years Gender:            M                    Additional Staff: Height:            185.42 cm            Admit Date:           6/11/2024 Weight:            78.02 kg             Admission Status:     Inpatient -                                                               Routine BSA / BMI:         2.02 m2 / 22.69      Encounter#:           [id number]                    kg/m2                                         Department Location:  [facility] Blood Pressure: 117 /56 mmHg Study Type:    TRANSTHORACIC ECHO (TTE) COMPLETE Diagnosis/ICD: Chest pain, unspecified-R07.9; Syncope-R55 CPT Code:      Echo Complete w Full Doppler-54617; Myocardial Strain                Imaging-94014 Patient History: Smoker:            Current. Pacer/Defib:       AICD/Perment pacemaker Pertinent History: CAD, COPD, CHF, Dyspnea and Chest Pain. PCI-stent x 2. Study Detail: The following Echo studies were performed: 2D, M-Mode, Doppler,                color flow and Strain. Technically challenging study due to body               habitus and COPD.  PHYSICIAN INTERPRETATION: Left Ventricle: The left ventricular systolic function is moderately decreased, with an estimated ejection fraction of 35-40%. Wall motion is abnormal. The left ventricular cavity size is normal. Left Ventricular Global Longitudinal Strain - 9.3 %. Spectral Doppler shows an impaired relaxation pattern of left ventricular diastolic filling. Left Atrium: The left atrium is normal in size. Right Ventricle: The right ventricle is normal in size. There is normal right ventricular global systolic function. A device is visualized in the right ventricle. Right Atrium: The right atrium is normal in size. There is a device visualized in the right atrium. Aortic Valve: The aortic valve is probably trileaflet. There is no evidence of aortic valve regurgitation. The peak instantaneous gradient of the aortic valve is 4.2 mmHg. Mitral Valve: The mitral valve is normal in structure. There is trace mitral valve regurgitation. Tricuspid Valve: The tricuspid valve is structurally normal. There is trace tricuspid regurgitation. The Doppler estimated RVSP is slightly elevated at 25.5 mmHg. Pulmonic Valve: The pulmonic valve is structurally normal. There is trace pulmonic valve regurgitation. Pericardium: There is no pericardial effusion noted. Aorta: The aortic root is normal. There is mild dilatation of the aortic root.  CONCLUSIONS:  1. Left ventricular systolic function is moderately decreased with a 35-40% estimated ejection fraction.  2. Spectral Doppler shows an impaired relaxation pattern of left ventricular diastolic filling.  3. Slightly elevated RVSP. QUANTITATIVE DATA SUMMARY: 2D MEASUREMENTS:                           Normal Ranges: IVSd:          1.60 cm    (0.6-1.1cm) LVPWd:         1.10 cm    (0.6-1.1cm) LVIDd:         5.80 cm    (3.9-5.9cm) LV Mass Index: 173.0 g/m2 LA VOLUME:                              Normal Ranges: LA Volume Index: 26.0 ml/m2 RA VOLUME BY A/L METHOD:                       Normal Ranges: RA Area A4C: 13.0 cm2 M-MODE MEASUREMENTS:                  Normal Ranges: Ao Root: 3.70 cm (2.0-3.7cm) LAs:     4.00 cm (2.7-4.0cm) LV SYSTOLIC FUNCTION BY 2D PLANIMETRY (MOD):                                          Normal Ranges: EF-A4C View:                      42.6 % (>=55%) EF-A2C View:                      32.0 % EF-Biplane:                       37.5 % Global Longitudinal Strain (GLS): 9.3 % LV DIASTOLIC FUNCTION:                           Normal Ranges: MV Peak E:    0.30 m/s    (0.7-1.2 m/s) MV Peak A:    0.67 m/s    (0.42-0.7 m/s) E/A Ratio:    0.45        (1.0-2.2) MV lateral e' 0.05 m/s MV medial e'  0.06 m/s MV A Dur:     155.00 msec MITRAL VALVE:                 Normal Ranges: MV DT: 197 msec (150-240msec) AORTIC VALVE:                         Normal Ranges: AoV Vmax:      1.03 m/s (<=1.7m/s) AoV Peak P.2 mmHg (<20mmHg) LVOT Max Oni:  1.00 m/s (<=1.1m/s) LVOT VTI:      18.20 cm LVOT Diameter: 2.00 cm  (1.8-2.4cm) AoV Area,Vmax: 3.04 cm2 (2.5-4.5cm2) AORTIC INSUFFICIENCY: AI Vmax:       4.60 m/s AI Half-time:  601 msec AI Decel Rate: 224.00 cm/s2  RIGHT VENTRICLE: RV Basal 3.30 cm RV Mid   2.10 cm RV Major 9.2 cm TAPSE:   19.3 mm RV s'    0.08 m/s TRICUSPID VALVE/RVSP:                             Normal Ranges: Peak TR Velocity: 2.37 m/s RV Syst Pressure: 25.5 mmHg (< 30mmHg) PULMONIC VALVE:                      Normal Ranges: PV Max Oni: 0.9 m/s  (0.6-0.9m/s) PV Max PG:  3.1 mmHg  [id number] Jeronimo Moe MD Electronically signed on 2024 at 9:05:10 AM  ** Final **     ECG 12 lead    Result Date: 2024  Atrial-paced rhythm RBBB and LPFB    Carotid duplex bilateral    Result Date: 2024  Preliminary Cardiology Report          [place] [address] Tel [phone] and Fax [phone]      Preliminary Vascular Lab Report  [facility]  CAROTID ARTERY DUPLEX BILATERAL  Patient Name:      EMILY ROSS Reading Physician:  [id number] Jonah Emmanuel MD Study Date:        6/12/2024        Ordering Physician: [id number] JAVIER GARNER MRN/PID:           [id number]         Technologist:       Angelika Gao T Accession#:        [id number]     Technologist 2: Date of Birth/Age: 1950       Encounter#:         [id number] Gender:            M Admission Status:  Inpatient        Location Performed: [facility]  Diagnosis/ICD: Syncope and collapse-R55 Procedure/CPT: 52205 Cerebrovascular Carotid Duplex scan complete  PRELIMINARY CONCLUSIONS: Right Carotid: Findings are consistent with less than 50% stenosis of the right proximal internal carotid artery. Laminar flow seen by color Doppler. Right external carotid artery appears patent with no evidence of stenosis. No evidence of hemodynamically significant stenosis of the right common carotid artery. The right vertebral artery is patent with antegrade flow. No evidence of hemodynamically significant stenosis in the right subclavian artery. Left Carotid: Findings are consistent with less than 50% stenosis of the left proximal internal carotid artery. Laminar flow seen by color Doppler. Left external carotid artery appears patent with no evidence of stenosis. No evidence of hemodynamically significant stenosis of the left common carotid artery. The left vertebral artery is patent with antegrade flow. No evidence of hemodynamically significant stenosis in the left subclavian artery.  Imaging & Doppler Findings: Right Plaque Morph: The proximal right internal carotid artery demonstrates calcified plaque. The mid right common carotid artery demonstrates calcified plaque. The distal right common carotid artery demonstrates calcified plaque. Left Plaque Morph: The proximal left internal carotid artery demonstrates calcified plaque. The mid left common carotid artery demonstrates heterogenous and calcified  plaque. The distal left common carotid artery demonstrates calcified plaque.   Right                       Left   PSV     EDV                PSV      EDV 68 cm/s           CCA P    65 cm/s 52 cm/s           CCA D    141 cm/s 53 cm/s 11 cm/s   ICA P    59 cm/s  14 cm/s 50 cm/s 15 cm/s   ICA M    53 cm/s  16 cm/s 50 cm/s 13 cm/s   ICA D    58 cm/s  16 cm/s 89 cm/s            ECA     71 cm/s 53 cm/s         Vertebral  49 cm/s 87 cm/s         Subclavian 130 cm/s                Right Left ICA/CCA Ratio  1.0  0.4   VASCULAR PRELIMINARY REPORT completed by Angelika Gao RVT on 6/12/2024 at 8:34:38 AM  ** Final **     CT head W O contrast trauma protocol    Result Date: 6/11/2024  Interpreted By:  Blaise Noonan, STUDY: CT HEAD W/O CONTRAST TRAUMA PROTOCOL; CT CERVICAL SPINE WO IV CONTRAST;  6/11/2024 4:47 pm   INDICATION: Signs/Symptoms:syncope, head trauma on eliquis.   COMPARISON: None.   ACCESSION NUMBER(S): [id number]; [id number]   ORDERING CLINICIAN: JOSE ALEJANDRO STEPHENS   TECHNIQUE: Noncontrast axial CT scan of head was performed, with coronal and sagittal reformats provided. The images were reviewed in bone, brain, blood and soft tissue windows.   Axial CT images of the cervical spine are obtained. Axial, coronal and sagittal reconstructions are provided for review.   FINDINGS: CT HEAD:   No hyperdense intracranial hemorrhage is identified. No mass effect or midline shift is present.   Gray-white differentiation is intact without evidence of acute CT apparent transcortical infarct. Small focus of encephalomalacia and gliosis is present in the right occipital lobe (series 203, image 54) likely representing sequela of prior infarct/injury.   No ventricular dilatation is present. Basal cisterns are patent. No extra-axial fluid collections are identified.   Scalp soft tissues do not demonstrate any acute abnormality. Calvarium is unremarkable. Mastoid air cells and middle ear cavities are clear.   Visualized  paranasal sinuses are unremarkable in appearance.   CT C-SPINE:   There is slight reversal normal lordotic curvature of the cervical spine, with a 2-3 mm anterolisthesis of C7 on T1.   Cervical vertebral body heights are preserved without evidence of compression fractures, although mild multilevel insufficiency endplate changes are present with associated Schmorl's nodes, most pronounced along the superior endplate of C4, superior endplate of C5 and superior endplate of C6.   Craniocervical junction is intact. Facet joints are preserved without evidence of subluxation or perching. No evidence of acute trauma to the posterior elements of the cervical spine is identified. No displaced transverse or spinous process fracture is present.   Multilevel intervertebral disc height loss is present, moderate at C4-C5 and C5-C6.   No high-grade stenosis is identified in the cervical spine, although at least mild spinal canal narrowing is suspected at the levels of C4-C5 and C5-C6 due to disc osteophyte complexes and ligamentum flavum thickening.   Mild-to-moderate neural foraminal narrowing is present at the level of C4-C5 and C5-C6 bilaterally due to endplate spurring hypertrophic uncovertebral and facet joint changes.       CT HEAD: 1. No evidence of hemorrhage, skull fracture, or other acute intracranial trauma/abnormality. 2. Small geographic area of cystic encephalomalacia and gliosis is present in the right occipital lobe, likely representing sequela of prior infarct/injury.   CT C-SPINE: 1. No evidence of acute trauma to the cervical spine. 2. Multilevel degenerative changes of the cervical spine, with mild spinal canal narrowing suspected at the levels of C4-C5 and C5-C6 due to disc osteophyte complex and ligamentum flavum thickening mild-to-moderate neural foraminal stenosis suspected at C4-C5 and C5-C6 due to endplate spurring and hypertrophic uncovertebral and facet joint changes..   MACRO: None   Signed by: Blaise  Shaista 6/11/2024 5:22 PM Dictation workstation:   [id number]    CT cervical spine wo IV contrast    Result Date: 6/11/2024  Interpreted By:  Blaise Noonan, STUDY: CT HEAD W/O CONTRAST TRAUMA PROTOCOL; CT CERVICAL SPINE WO IV CONTRAST;  6/11/2024 4:47 pm   INDICATION: Signs/Symptoms:syncope, head trauma on eliquis.   COMPARISON: None.   ACCESSION NUMBER(S): [id number]; [id number]   ORDERING CLINICIAN: JOSE ALEJANDRO STEPHENS   TECHNIQUE: Noncontrast axial CT scan of head was performed, with coronal and sagittal reformats provided. The images were reviewed in bone, brain, blood and soft tissue windows.   Axial CT images of the cervical spine are obtained. Axial, coronal and sagittal reconstructions are provided for review.   FINDINGS: CT HEAD:   No hyperdense intracranial hemorrhage is identified. No mass effect or midline shift is present.   Gray-white differentiation is intact without evidence of acute CT apparent transcortical infarct. Small focus of encephalomalacia and gliosis is present in the right occipital lobe (series 203, image 54) likely representing sequela of prior infarct/injury.   No ventricular dilatation is present. Basal cisterns are patent. No extra-axial fluid collections are identified.   Scalp soft tissues do not demonstrate any acute abnormality. Calvarium is unremarkable. Mastoid air cells and middle ear cavities are clear.   Visualized paranasal sinuses are unremarkable in appearance.   CT C-SPINE:   There is slight reversal normal lordotic curvature of the cervical spine, with a 2-3 mm anterolisthesis of C7 on T1.   Cervical vertebral body heights are preserved without evidence of compression fractures, although mild multilevel insufficiency endplate changes are present with associated Schmorl's nodes, most pronounced along the superior endplate of C4, superior endplate of C5 and superior endplate of C6.   Craniocervical junction is intact. Facet joints are preserved without  evidence of subluxation or perching. No evidence of acute trauma to the posterior elements of the cervical spine is identified. No displaced transverse or spinous process fracture is present.   Multilevel intervertebral disc height loss is present, moderate at C4-C5 and C5-C6.   No high-grade stenosis is identified in the cervical spine, although at least mild spinal canal narrowing is suspected at the levels of C4-C5 and C5-C6 due to disc osteophyte complexes and ligamentum flavum thickening.   Mild-to-moderate neural foraminal narrowing is present at the level of C4-C5 and C5-C6 bilaterally due to endplate spurring hypertrophic uncovertebral and facet joint changes.       CT HEAD: 1. No evidence of hemorrhage, skull fracture, or other acute intracranial trauma/abnormality. 2. Small geographic area of cystic encephalomalacia and gliosis is present in the right occipital lobe, likely representing sequela of prior infarct/injury.   CT C-SPINE: 1. No evidence of acute trauma to the cervical spine. 2. Multilevel degenerative changes of the cervical spine, with mild spinal canal narrowing suspected at the levels of C4-C5 and C5-C6 due to disc osteophyte complex and ligamentum flavum thickening mild-to-moderate neural foraminal stenosis suspected at C4-C5 and C5-C6 due to endplate spurring and hypertrophic uncovertebral and facet joint changes..   MACRO: None   Signed by: Blaise Noonan 6/11/2024 5:22 PM Dictation workstation:   [id number]    CT angio chest abdomen pelvis    Result Date: 6/11/2024  Interpreted By:  Gordo Maldonado, STUDY: CT ANGIO CHEST ABDOMEN PELVIS; ;  6/11/2024 4:47 pm   CT ANGIOGRAM CHEST, ABDOMEN AND PELVIS WITHOUT AND WITH CONTRAST   INDICATION: Signs/Symptoms:h/o aorta repiair with CP and syncope. 73-year-old man with chest pain.   COMPARISON: Chest radiograph 06/11/2024   ACCESSION NUMBER(S): [id number]   ORDERING CLINICIAN: JOSE ALEJANDRO STEPHENS   TECHNIQUE: Contiguous acquired helical  axial images were obtained of the chest, abdomen, and pelvis without and with intravascular contrast. Coronal and sagittal multiplanar reformatted maximum intensity projection images were obtained. 3D volumetric surface rendered representation of arterial structures was performed on a separate workstation and submitted for interpretation.   FINDINGS: Angiographic:   The heart size is enlarged. Right heart pacer leads enter the left subclavian vein adjacent to the left anterosuperior chest wall generator tract.   The examination is not optimized for sensitive evaluation of pulmonary arteries. There are no large central pulmonary artery filling defects.   The ascending thoracic aorta all and arch vessels are normal in caliber and patent. There is mild-to-moderate intimal irregularity with calcified and noncalcified atherosclerotic plaque of the aortic arch which is nonaneurysmal by size criteria. There is irregularity of noncalcified atherosclerotic plaque of the descending thoracic aorta with few areas of of small positive the ventral wall penetrating atherosclerotic ulceration (for example image [id number]).   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 The celiac, superior mesenteric, and bilateral renal arteries are patent. The patient is status post infrarenal abdominal aorto bi-iliac stent graft placement with patency of graft components extending into the iliac arteries bilaterally. The origin of the inferior mesenteric artery has been covered however distal branches are opacified via collateralization.   The right common iliac artery stent graft component is patent. The right internal and external iliac arteries are patent. There is mild atherosclerotic narrowing of the right common femoral artery and the partially imaged upstream right superficial and profunda  femoris arteries are patent.   The left common iliac artery stent graft component is patent. There is tortuosity of the left internal and external iliac arteries with associated moderate to severe narrowing of the left external iliac artery (image 521 of [phone]). Distally, the left internal and external iliac arteries are patent. The left common femoral arteries mildly atherosclerotic and patent. The partially imaged upstream left superficial and profunda femoris arteries are patent.   Large iliac veins the pelvis and the IVC are grossly unremarkable. The renal veins are patent. The portal, superior mesenteric, and splenic veins are patent. Hepatic veins are patent. Suspect high-grade stenosis or possible occlusion of the left brachiocephalic vein adjacent to pacer leads. The internal jugular veins are patent bilaterally.       Nonangiographic:   The partially imaged thyroid is unremarkable. No supraclavicular or thoracic inlet lymphadenopathy. No pathologically enlarged mediastinal or hilar lymph nodes. There is apical predominant centrilobular emphysema. Respiratory motion limits evaluation of the pulmonary parenchyma. There a few areas of pleural calcification including the hemidiaphragms and the posteromedial aspects of the parietal pleura. No pleural effusion. The liver is normal in size and shape. The gallbladder is nondistended. No biliary ductal dilation. The spleen, adrenal glands, and pancreas are unremarkable. The renal parenchyma enhances symmetrically. A simple attenuating posterior left renal cystic lesion measuring 19 mm in diameter. Few other subcentimeter low attenuating renal lesions too small to further characterize. The urinary bladder is partially decompressed limiting evaluation. Prostate and seminal vesicles are unremarkable. No retroperitoneal lymphadenopathy. Hiatal hernia. Dilated loops of fluid filled jejunum in the left abdomen, with adjacent loops of wall thickening versus decompression.  Distal loops are normal in caliber. Distal ileum shows gas and fluid as can be seen with feculization of the small bowel. The appendix is normal. Stool in the colon. Sigmoid predominant diverticulosis without evidence of diverticulitis. There is a left inguinal hernia containing loops of small bowel without evidence of incarceration.   There is age indeterminate compression deformities of L3 and L2. Left femoral neck fixation hardware.         1. No acute aortic pathology. 2. Infrarenal aorto bi-iliac stent graft. No abdominal aortic aneurysm or evidence of endoleak. Stent graft components are patent. 3. Mild to moderate thoracic aortic arch and descending thoracic aortic atherosclerosis with few areas of small penetrating atherosclerotic ulcers. There is additionally left lateral saccular outpouching at the level of the diaphragm short in length where the aorta measures 32 mm in caliber. 4. Suspect at least moderate grade stenosis of the left external iliac artery related to atherosclerosis and tortuosity just distal to the left common iliac artery bifurcation.   5. Dilated loops of jejunum in the left abdomen, adjacent to areas of small bowel wall thickening versus is decompression. Consider functional obstruction, enteritis. No mechanical bowel obstruction identified. 6. Left inguinal hernia containing loops of small bowel without evidence of incarceration. 7. Sigmoid diverticulosis. 8. Age indeterminate L2 and L4 vertebral body compression deformities.   MACRO: None   Signed by: Gordo Maldonado 6/11/2024 5:22 PM Dictation workstation:   [id number]    XR chest 1 view    Result Date: 6/11/2024  Interpreted By:  Blaise Noonan, STUDY: XR CHEST 1 VIEW;  6/11/2024 4:10 pm   INDICATION: Signs/Symptoms:Chest Pain.   COMPARISON: Radiographs of the chest dated 10/27/2007.   ACCESSION NUMBER(S): [id number]   ORDERING CLINICIAN: JOSE ALEJANDRO STEPHENS   FINDINGS: AP radiograph of the chest was provided.   Multi lead  left subclavian AICD/pacemaker is present, similar in appearance to prior exam.   CARDIOMEDIASTINAL SILHOUETTE: Cardiomediastinal silhouette is normal in size and configuration.   LUNGS: No consolidation or pleural effusion is evident. Dense material in the right lung base may represent pleural calyx.   ABDOMEN: No remarkable upper abdominal findings.   BONES: No acute osseous changes.       1.  No evidence of consolidation or pleural effusion. New dense material in the right lung base may represent pleural calcifications.       MACRO: None   Signed by: Blaise Noonan 6/11/2024 4:31 PM Dictation workstation:   [id number]       Assessment/Plan   ectatic aorta  Abdominal aortic aneurysm  Nicotine dependence     Dr. Carlin and this practitioner evaluated images from CT which revealed descending thoracic ectatic area on the aorta.    There is ectasia at the level of the diaphragm with a left lateral saccular aneurysm outpouching measuring 12 mm in length with, 3.2 cm in diameter as measured on coronal imaging . No surgical intervention required patient will [facility] with the [facility]  with thoracic surgery considering this is a descending aortic  ectasia. Suspect at least moderate grade stenosis of the left external iliac artery related to atherosclerosis and tortuosity just distal to the left common iliac artery bifurcation. At present patient has adequate arterial perfusion to lower extremities on physical exam.   No emergent surgical or endovascular intervention required.    Patient may need imaging disc  copy from radiology. Patient should follow-up with his vascular  surgeon with [facility].  Carotid duplex study shows less than 50% stenosis bilateral carotid arteries with right velocities ICA P  53/11 cm/s and  left ICA P 59/14 cm/s.  patient shows minimal carotid stenosis. continue daily antiplatelet therapy, maximize statin, and antihypertensive medication for  CVA , Carotid stenosis, AAA  and ectatic aorta  prophylaxis. There was a shared discussion with the patient to continue a lifestyle modification that promotes: The adherence to strict BP and glycemic control, healthy dietary habit changes, incorporation of daily exercise regimen, adherence to all prescription/OTC medication schedules, attendance to all follow-up appointments,   Patient strongly advised cessation from smoking , abstinence from alcohol and illicit drug use if applicable.  From a vascular surgery standpoint there are no objections for this patient to be discharged.     Thank you very much for allowing Vascular Surgery to be involved in the care of your patient sincerely Jason GOSS .  (This note was generated with voice recognition software and may contain errors including spelling, grammar, syntax and missed recognition of what was dictated, of which may not have been fully corrected)

## 2024-06-12 NOTE — NURSING NOTE
Pulmonary Disease Navigator Documentation:    Pulmonary disease education was performed by the Respiratory Therapist with a good understanding: yes  Home oxygen: none on admission; currently on room air tolerating well  COPD triggers discussed and when to notify physician?  yes  COPD Education booklet given to patient with education?  no  Benefits of participating in a Pulmonary Rehab program discussed: yes  Pulmonary Rehab Referral written?  Not at this time.  Patient stated he is planning on traveling for an extended period in the near future  Home medication usage education done?  Yes, Albuterol MDI/Neb (patient prefers Ventolin as his inhaler of choice); Stiolto.  Patient may benefit from addition of ICS to his home regimen.  Discussed with pulmonary at bedside.  Pursed lip breathing education done?  yes  Diaphragmatic cough education done? yes    Comments: Patient stated he [facility] for [facility] needs.  Patient unsure of physician name at this time.  Patient requesting refill for his Ventolin MDI, medical team to be messaged for home-going script.  [facility] stated [facility] does not carry Vent[facility] at this time so he prefers printed script.    Respiratory therapist met with patient to discuss smoking cessation. Patient educated on nicotine addiction, dangers associated with smoking, and risk factors associated with exposure to second hand smoke. Discussed patient's behavioral triggers for smoking, behavioral interventions for successful smoking cessation, and support group availability in the area. Patient has been advised that  hospitals are smoke-free facilities.  Patient declined need for NRT or outpatient counseling at this time.

## 2024-06-12 NOTE — CONSULTS
Inpatient consult to Neurology  Consult performed by: Crystal Torres MD  Consult ordered by: Fernando Isaac DO      History Of Present Illness  Osmany Michele is a 73 y.o. male with PMH of smoking, COPD, CAD s/p multiple stents, SSS s/p PPM, s/p aortic repair/graft, admitted for chest pain and syncope.  He has been using his inhalers and nebulizers more often at home for several days. increased utilization of rescue inhaler and nebulizers at home the past several days.  He was having worsening cough, shortness of breath and wheezing. He passed out few nights before admission.  He tells me that he was sitting on the side of the bed, when he stood up he became lightheaded and passed out.  He has not been having lightheadedness often however he does have a few seconds of that if he is leaning forward.     He denies history of passing out.  Denies numbness/tingling/ vision changes/ speech changes or weakness. Denies headache/nausea.  In the ER he was found to be tachypneic, RR 20s.   EKG showed atrial paced rhythm.  . CT head showed a small cystic encephalomalacia and gliosis present in the right occipital lobe likely representing sequela of prior infarct/injury.   Past Medical History  No past medical history on file.  Surgical History  No past surgical history on file.  Social History     Allergies  Azithromycin, Effexor [venlafaxine], and Baclofen  Medications Prior to Admission   Medication Sig Dispense Refill Last Dose    albuterol 90 mcg/actuation inhaler Inhale 1-2 puffs every 6 hours if needed.   Unknown    albuterol 2.5 mg /3 mL (0.083 %) nebulizer solution Take 3 mL (2.5 mg) by nebulization if needed for wheezing.   Unknown    apixaban (Eliquis) 5 mg tablet Take 1 tablet (5 mg) by mouth 2 times a day.   Unknown    atorvastatin (Lipitor) 40 mg tablet Take 1 tablet (40 mg) by mouth once daily.   Unknown    carvedilol (Coreg) 6.25 mg tablet Take 1 tablet (6.25 mg) by mouth 2 times a day.   Unknown     cholecalciferol (Vitamin D-3) 25 MCG (1000 UT) capsule Take 1 capsule (25 mcg) by mouth once daily.   Unknown    clopidogrel (Plavix) 75 mg tablet Take 1 tablet (75 mg) by mouth once daily.   Unknown    cyanocobalamin (Vitamin B-12) 1,000 mcg tablet Take 1 tablet (1,000 mcg) by mouth once daily.   Unknown    empagliflozin (Jardiance) 25 mg Take 0.5 tablets (12.5 mg) by mouth once daily.   Unknown    sacubitriL-valsartan (Entresto) 24-26 mg tablet Take 1 tablet by mouth 2 times a day.   Unknown    spironolactone (Aldactone) 25 mg tablet Take 0.5 tablets (12.5 mg) by mouth once daily.   Unknown       Review of Systems  Exam:   Appearance:  no acute distress, cooperative.  HEENT: normocephalic /atraumatic.  Cardiovascular/Lungs/Abdomen: No carotid bruits to auscultation bilaterally, heart is regular in rate and rhythm.  Extremities/Skin: no peripheral edema.  Decreased dorsalis pedis bilateral.  Pale.    NEUROLOGICAL EXAMINATION:    Mental status:  alert and oriented to person, place, date and situation.  remote memory intact, fund of knowledge intact, attention and concentration intact.  No dysarthria. No signs of aphasia.     Cranial nerves:    II/III: Fundoscopic examination attempted at bedside, limited. Visual fields are full. Pupils are 2 mm and reactive bilaterally.  III/IV/VI: Extraocular movements are full with no nystagmus.   V: Facial sensation is intact to light touch.  VII: Face is symmetric.  VIII: hearing is intact bilaterally.  IX/X: Palate elevates symmetrically to phonation.  XI: Sternocleidomastoid is MRC 5/5 to strength testing.  XII: Tongue is midline.    Motor exam: Strength is MRC 5/5 throughout. tone is normal.    Sensory exam: Sensation is intact to light touch throughout.    Reflexes: Reflexes are 2+ and symmetric. Bilateral plantar responses are flexor.    Coordination: intact     Last Recorded Vitals  Blood pressure 117/56, pulse 62, temperature 36.5 °C (97.7 °F), temperature source  "Temporal, resp. rate 18, height 1.854 m (6' 1\"), weight 78 kg (172 lb), SpO2 90%.    Relevant Results  Scheduled medications  apixaban, 5 mg, oral, BID  atorvastatin, 40 mg, oral, Daily  carvedilol, 6.25 mg, oral, BID  cefTRIAXone, 1 g, intravenous, q24h  cholecalciferol, 1,000 Units, oral, Daily  clopidogrel, 75 mg, oral, Daily  cyanocobalamin, 1,000 mcg, oral, Daily  empagliflozin, 12.5 mg, oral, Daily  methylPREDNISolone sodium succinate (PF), 40 mg, intravenous, q8h [place]  sacubitriL-valsartan, 1 tablet, oral, BID      Continuous medications     PRN medications  PRN medications: acetaminophen **OR** acetaminophen **OR** acetaminophen, albuterol, benzonatate, ipratropium-albuteroL, nicotine, oxygen  Results for orders placed or performed during the hospital encounter of 06/11/24 (from the past 24 hour(s))   CBC and Auto Differential   Result Value Ref Range    WBC 6.1 4.4 - 11.3 x10*3/uL    nRBC 0.0 0.0 - 0.0 /100 WBCs    RBC 4.81 4.50 - 5.90 x10*6/uL    Hemoglobin 14.3 13.5 - 17.5 g/dL    Hematocrit 44.4 41.0 - 52.0 %    MCV 92 80 - 100 fL    MCH 29.7 26.0 - 34.0 pg    MCHC 32.2 32.0 - 36.0 g/dL    RDW 14.1 11.5 - 14.5 %    Platelets 266 150 - 450 x10*3/uL    Neutrophils % 59.6 40.0 - 80.0 %    Immature Granulocytes %, Automated 0.3 0.0 - 0.9 %    Lymphocytes % 27.7 13.0 - 44.0 %    Monocytes % 9.1 2.0 - 10.0 %    Eosinophils % 2.3 0.0 - 6.0 %    Basophils % 1.0 0.0 - 2.0 %    Neutrophils Absolute 3.66 1.60 - 5.50 x10*3/uL    Immature Granulocytes Absolute, Automated 0.02 0.00 - 0.50 x10*3/uL    Lymphocytes Absolute 1.70 0.80 - 3.00 x10*3/uL    Monocytes Absolute 0.56 0.05 - 0.80 x10*3/uL    Eosinophils Absolute 0.14 0.00 - 0.40 x10*3/uL    Basophils Absolute 0.06 0.00 - 0.10 x10*3/uL   Comprehensive Metabolic Panel   Result Value Ref Range    Glucose 114 (H) 74 - 99 mg/dL    Sodium 139 136 - 145 mmol/L    Potassium 4.3 3.5 - 5.3 mmol/L    Chloride 106 98 - 107 mmol/L    Bicarbonate 27 21 - 32 mmol/L    Anion Gap " 10 10 - 20 mmol/L    Urea Nitrogen 19 6 - 23 mg/dL    Creatinine 0.70 0.50 - 1.30 mg/dL    eGFR >90 >60 mL/min/1.73m*2    Calcium 8.9 8.6 - 10.3 mg/dL    Albumin 4.0 3.4 - 5.0 g/dL    Alkaline Phosphatase 51 33 - 136 U/L    Total Protein 6.5 6.4 - 8.2 g/dL    AST 15 9 - 39 U/L    Bilirubin, Total 0.5 0.0 - 1.2 mg/dL    ALT 15 10 - 52 U/L   Magnesium   Result Value Ref Range    Magnesium 2.06 1.60 - 2.40 mg/dL   B-Type Natriuretic Peptide   Result Value Ref Range     (H) 0 - 99 pg/mL   Troponin I, High Sensitivity, Initial   Result Value Ref Range    Troponin I, High Sensitivity 9 0 - 20 ng/L   Troponin, High Sensitivity, 1 Hour   Result Value Ref Range    Troponin I, High Sensitivity 6 0 - 20 ng/L   Lipid Panel   Result Value Ref Range    Cholesterol 160 0 - 199 mg/dL    HDL-Cholesterol 27.6 mg/dL    Cholesterol/HDL Ratio 5.8     LDL Calculated 119 (H) <=99 mg/dL    VLDL 14 0 - 40 mg/dL    Triglycerides 68 0 - 149 mg/dL    Non HDL Cholesterol 132 0 - 149 mg/dL   CBC   Result Value Ref Range    WBC 5.7 4.4 - 11.3 x10*3/uL    nRBC 0.0 0.0 - 0.0 /100 WBCs    RBC 4.80 4.50 - 5.90 x10*6/uL    Hemoglobin 13.9 13.5 - 17.5 g/dL    Hematocrit 44.9 41.0 - 52.0 %    MCV 94 80 - 100 fL    MCH 29.0 26.0 - 34.0 pg    MCHC 31.0 (L) 32.0 - 36.0 g/dL    RDW 14.1 11.5 - 14.5 %    Platelets 231 150 - 450 x10*3/uL   Basic Metabolic Panel   Result Value Ref Range    Glucose 195 (H) 74 - 99 mg/dL    Sodium 139 136 - 145 mmol/L    Potassium 4.6 3.5 - 5.3 mmol/L    Chloride 106 98 - 107 mmol/L    Bicarbonate 29 21 - 32 mmol/L    Anion Gap 9 (L) 10 - 20 mmol/L    Urea Nitrogen 20 6 - 23 mg/dL    Creatinine 0.72 0.50 - 1.30 mg/dL    eGFR >90 >60 mL/min/1.73m*2    Calcium 8.4 (L) 8.6 - 10.3 mg/dL                    Counselor Coma Scale  Best Eye Response: Spontaneous  Best Verbal Response: Oriented  Best Motor Response: Follows commands  Counselor Coma Scale Score: 15                 I have personally reviewed the following imaging results  CT head W O contrast trauma protocol    Result Date: 6/11/2024  Interpreted By:  Blaise Noonan, STUDY: CT HEAD W/O CONTRAST TRAUMA PROTOCOL; CT CERVICAL SPINE WO IV CONTRAST;  6/11/2024 4:47 pm   INDICATION: Signs/Symptoms:syncope, head trauma on eliquis.   COMPARISON: None.   ACCESSION NUMBER(S): [id number]; [id number]   ORDERING CLINICIAN: JOSE ALEJANDRO STEPHENS   TECHNIQUE: Noncontrast axial CT scan of head was performed, with coronal and sagittal reformats provided. The images were reviewed in bone, brain, blood and soft tissue windows.   Axial CT images of the cervical spine are obtained. Axial, coronal and sagittal reconstructions are provided for review.   FINDINGS: CT HEAD:   No hyperdense intracranial hemorrhage is identified. No mass effect or midline shift is present.   Gray-white differentiation is intact without evidence of acute CT apparent transcortical infarct. Small focus of encephalomalacia and gliosis is present in the right occipital lobe (series 203, image 54) likely representing sequela of prior infarct/injury.   No ventricular dilatation is present. Basal cisterns are patent. No extra-axial fluid collections are identified.   Scalp soft tissues do not demonstrate any acute abnormality. Calvarium is unremarkable. Mastoid air cells and middle ear cavities are clear.   Visualized paranasal sinuses are unremarkable in appearance.   CT C-SPINE:   There is slight reversal normal lordotic curvature of the cervical spine, with a 2-3 mm anterolisthesis of C7 on T1.   Cervical vertebral body heights are preserved without evidence of compression fractures, although mild multilevel insufficiency endplate changes are present with associated Schmorl's nodes, most pronounced along the superior endplate of C4, superior endplate of C5 and superior endplate of C6.   Craniocervical junction is intact. Facet joints are preserved without evidence of subluxation or perching. No evidence of acute trauma to the  posterior elements of the cervical spine is identified. No displaced transverse or spinous process fracture is present.   Multilevel intervertebral disc height loss is present, moderate at C4-C5 and C5-C6.   No high-grade stenosis is identified in the cervical spine, although at least mild spinal canal narrowing is suspected at the levels of C4-C5 and C5-C6 due to disc osteophyte complexes and ligamentum flavum thickening.   Mild-to-moderate neural foraminal narrowing is present at the level of C4-C5 and C5-C6 bilaterally due to endplate spurring hypertrophic uncovertebral and facet joint changes.       CT HEAD: 1. No evidence of hemorrhage, skull fracture, or other acute intracranial trauma/abnormality. 2. Small geographic area of cystic encephalomalacia and gliosis is present in the right occipital lobe, likely representing sequela of prior infarct/injury.   CT C-SPINE: 1. No evidence of acute trauma to the cervical spine. 2. Multilevel degenerative changes of the cervical spine, with mild spinal canal narrowing suspected at the levels of C4-C5 and C5-C6 due to disc osteophyte complex and ligamentum flavum thickening mild-to-moderate neural foraminal stenosis suspected at C4-C5 and C5-C6 due to endplate spurring and hypertrophic uncovertebral and facet joint changes..   MACRO: None   Signed by: Blaise Noonan 6/11/2024 5:22 PM Dictation workstation:   [id number]    CT cervical spine wo IV contrast    Result Date: 6/11/2024  Interpreted By:  Blaise Noonan, STUDY: CT HEAD W/O CONTRAST TRAUMA PROTOCOL; CT CERVICAL SPINE WO IV CONTRAST;  6/11/2024 4:47 pm   INDICATION: Signs/Symptoms:syncope, head trauma on eliquis.   COMPARISON: None.   ACCESSION NUMBER(S): [id number]; [id number]   ORDERING CLINICIAN: JOSE ALEJANDRO STEPHENS   TECHNIQUE: Noncontrast axial CT scan of head was performed, with coronal and sagittal reformats provided. The images were reviewed in bone, brain, blood and soft tissue windows.    Axial CT images of the cervical spine are obtained. Axial, coronal and sagittal reconstructions are provided for review.   FINDINGS: CT HEAD:   No hyperdense intracranial hemorrhage is identified. No mass effect or midline shift is present.   Gray-white differentiation is intact without evidence of acute CT apparent transcortical infarct. Small focus of encephalomalacia and gliosis is present in the right occipital lobe (series 203, image 54) likely representing sequela of prior infarct/injury.   No ventricular dilatation is present. Basal cisterns are patent. No extra-axial fluid collections are identified.   Scalp soft tissues do not demonstrate any acute abnormality. Calvarium is unremarkable. Mastoid air cells and middle ear cavities are clear.   Visualized paranasal sinuses are unremarkable in appearance.   CT C-SPINE:   There is slight reversal normal lordotic curvature of the cervical spine, with a 2-3 mm anterolisthesis of C7 on T1.   Cervical vertebral body heights are preserved without evidence of compression fractures, although mild multilevel insufficiency endplate changes are present with associated Schmorl's nodes, most pronounced along the superior endplate of C4, superior endplate of C5 and superior endplate of C6.   Craniocervical junction is intact. Facet joints are preserved without evidence of subluxation or perching. No evidence of acute trauma to the posterior elements of the cervical spine is identified. No displaced transverse or spinous process fracture is present.   Multilevel intervertebral disc height loss is present, moderate at C4-C5 and C5-C6.   No high-grade stenosis is identified in the cervical spine, although at least mild spinal canal narrowing is suspected at the levels of C4-C5 and C5-C6 due to disc osteophyte complexes and ligamentum flavum thickening.   Mild-to-moderate neural foraminal narrowing is present at the level of C4-C5 and C5-C6 bilaterally due to endplate spurring  hypertrophic uncovertebral and facet joint changes.       CT HEAD: 1. No evidence of hemorrhage, skull fracture, or other acute intracranial trauma/abnormality. 2. Small geographic area of cystic encephalomalacia and gliosis is present in the right occipital lobe, likely representing sequela of prior infarct/injury.   CT C-SPINE: 1. No evidence of acute trauma to the cervical spine. 2. Multilevel degenerative changes of the cervical spine, with mild spinal canal narrowing suspected at the levels of C4-C5 and C5-C6 due to disc osteophyte complex and ligamentum flavum thickening mild-to-moderate neural foraminal stenosis suspected at C4-C5 and C5-C6 due to endplate spurring and hypertrophic uncovertebral and facet joint changes..   MACRO: None   Signed by: Blaise Noonan 6/11/2024 5:22 PM Dictation workstation:   [id number]    CT angio chest abdomen pelvis    Result Date: 6/11/2024  Interpreted By:  Gordo Maldonado, STUDY: CT ANGIO CHEST ABDOMEN PELVIS; ;  6/11/2024 4:47 pm   CT ANGIOGRAM CHEST, ABDOMEN AND PELVIS WITHOUT AND WITH CONTRAST   INDICATION: Signs/Symptoms:h/o aorta repiair with CP and syncope. 73-year-old man with chest pain.   COMPARISON: Chest radiograph 06/11/2024   ACCESSION NUMBER(S): [id number]   ORDERING CLINICIAN: JOSE ALEJANDRO STEPHENS   TECHNIQUE: Contiguous acquired helical axial images were obtained of the chest, abdomen, and pelvis without and with intravascular contrast. Coronal and sagittal multiplanar reformatted maximum intensity projection images were obtained. 3D volumetric surface rendered representation of arterial structures was performed on a separate workstation and submitted for interpretation.   FINDINGS: Angiographic:   The heart size is enlarged. Right heart pacer leads enter the left subclavian vein adjacent to the left anterosuperior chest wall generator tract.   The examination is not optimized for sensitive evaluation of pulmonary arteries. There are no large central  pulmonary artery filling defects.   The ascending thoracic aorta all and arch vessels are normal in caliber and patent. There is mild-to-moderate intimal irregularity with calcified and noncalcified atherosclerotic plaque of the aortic arch which is nonaneurysmal by size criteria. There is irregularity of noncalcified atherosclerotic plaque of the descending thoracic aorta with few areas of of small positive the ventral wall penetrating atherosclerotic ulceration (for example image [id number]).   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 The celiac, superior mesenteric, and bilateral renal arteries are patent. The patient is status post infrarenal abdominal aorto bi-iliac stent graft placement with patency of graft components extending into the iliac arteries bilaterally. The origin of the inferior mesenteric artery has been covered however distal branches are opacified via collateralization.   The right common iliac artery stent graft component is patent. The right internal and external iliac arteries are patent. There is mild atherosclerotic narrowing of the right common femoral artery and the partially imaged upstream right superficial and profunda femoris arteries are patent.   The left common iliac artery stent graft component is patent. There is tortuosity of the left internal and external iliac arteries with associated moderate to severe narrowing of the left external iliac artery (image 521 of 633). Distally, the left internal and external iliac arteries are patent. The left common femoral arteries mildly atherosclerotic and patent. The partially imaged upstream left superficial and profunda femoris arteries are patent.   Large iliac veins the pelvis and the IVC are grossly unremarkable. The renal veins are patent. The portal, superior mesenteric, and splenic  veins are patent. Hepatic veins are patent. Suspect high-grade stenosis or possible occlusion of the left brachiocephalic vein adjacent to pacer leads. The internal jugular veins are patent bilaterally.       Nonangiographic:   The partially imaged thyroid is unremarkable. No supraclavicular or thoracic inlet lymphadenopathy. No pathologically enlarged mediastinal or hilar lymph nodes. There is apical predominant centrilobular emphysema. Respiratory motion limits evaluation of the pulmonary parenchyma. There a few areas of pleural calcification including the hemidiaphragms and the posteromedial aspects of the parietal pleura. No pleural effusion. The liver is normal in size and shape. The gallbladder is nondistended. No biliary ductal dilation. The spleen, adrenal glands, and pancreas are unremarkable. The renal parenchyma enhances symmetrically. A simple attenuating posterior left renal cystic lesion measuring 19 mm in diameter. Few other subcentimeter low attenuating renal lesions too small to further characterize. The urinary bladder is partially decompressed limiting evaluation. Prostate and seminal vesicles are unremarkable. No retroperitoneal lymphadenopathy. Hiatal hernia. Dilated loops of fluid filled jejunum in the left abdomen, with adjacent loops of wall thickening versus decompression. Distal loops are normal in caliber. Distal ileum shows gas and fluid as can be seen with feculization of the small bowel. The appendix is normal. Stool in the colon. Sigmoid predominant diverticulosis without evidence of diverticulitis. There is a left inguinal hernia containing loops of small bowel without evidence of incarceration.   There is age indeterminate compression deformities of L3 and L2. Left femoral neck fixation hardware.         1. No acute aortic pathology. 2. Infrarenal aorto bi-iliac stent graft. No abdominal aortic aneurysm or evidence of endoleak. Stent graft components are patent. 3. Mild to moderate  thoracic aortic arch and descending thoracic aortic atherosclerosis with few areas of small penetrating atherosclerotic ulcers. There is additionally left lateral saccular outpouching at the level of the diaphragm short in length where the aorta measures 32 mm in caliber. 4. Suspect at least moderate grade stenosis of the left external iliac artery related to atherosclerosis and tortuosity just distal to the left common iliac artery bifurcation.   5. Dilated loops of jejunum in the left abdomen, adjacent to areas of small bowel wall thickening versus is decompression. Consider functional obstruction, enteritis. No mechanical bowel obstruction identified. 6. Left inguinal hernia containing loops of small bowel without evidence of incarceration. 7. Sigmoid diverticulosis. 8. Age indeterminate L2 and L4 vertebral body compression deformities.   MACRO: None   Signed by: Gordo Maldonado 6/11/2024 5:22 PM Dictation workstation:   [id number]    XR chest 1 view    Result Date: 6/11/2024  Interpreted By:  Blaise Noonan, STUDY: XR CHEST 1 VIEW;  6/11/2024 4:10 pm   INDICATION: Signs/Symptoms:Chest Pain.   COMPARISON: Radiographs of the chest dated 10/27/2007.   ACCESSION NUMBER(S): [id number]   ORDERING CLINICIAN: JOSE ALEJANDRO STEPHENS   FINDINGS: AP radiograph of the chest was provided.   Multi lead left subclavian AICD/pacemaker is present, similar in appearance to prior exam.   CARDIOMEDIASTINAL SILHOUETTE: Cardiomediastinal silhouette is normal in size and configuration.   LUNGS: No consolidation or pleural effusion is evident. Dense material in the right lung base may represent pleural calyx.   ABDOMEN: No remarkable upper abdominal findings.   BONES: No acute osseous changes.       1.  No evidence of consolidation or pleural effusion. New dense material in the right lung base may represent pleural calcifications.       Assessment/Plan:  With prior smoking history and his CAD I would recommend carotid US and  cardiology consultation.  Does not sound like seizure based on history.  Obtain orthostatic vitals.   Follow-up neurology as needed.    Thank you for this consultation.  Please call neurologist on-call for any questions or concerns.

## 2024-06-12 NOTE — CARE PLAN
RN called stating patient was having itching and redness during Azithromycin infusion. Infusion stopped. Azithromycin discontinued. Informed RN to add azithromycin to allergy list. Ceftriaxone ordered. RN stated itching stopped when infusion stopped. Continue to monitor.

## 2024-06-13 PROBLEM — I47.20 VENTRICULAR TACHYCARDIA (MULTI): Status: ACTIVE | Noted: 2024-06-11

## 2024-06-13 PROBLEM — R07.9 CHEST PAIN, UNSPECIFIED: Status: ACTIVE | Noted: 2024-06-11

## 2024-06-13 LAB
ALBUMIN SERPL BCP-MCNC: 3.9 G/DL (ref 3.4–5)
ALP SERPL-CCNC: 52 U/L (ref 33–136)
ALT SERPL W P-5'-P-CCNC: 14 U/L (ref 10–52)
ANION GAP SERPL CALC-SCNC: 12 MMOL/L (ref 10–20)
AST SERPL W P-5'-P-CCNC: 10 U/L (ref 9–39)
BASOPHILS # BLD AUTO: 0.01 X10*3/UL (ref 0–0.1)
BASOPHILS NFR BLD AUTO: 0.1 %
BILIRUB SERPL-MCNC: 0.4 MG/DL (ref 0–1.2)
BUN SERPL-MCNC: 24 MG/DL (ref 6–23)
CALCIUM SERPL-MCNC: 9 MG/DL (ref 8.6–10.3)
CHLORIDE SERPL-SCNC: 104 MMOL/L (ref 98–107)
CO2 SERPL-SCNC: 25 MMOL/L (ref 21–32)
CREAT SERPL-MCNC: 0.72 MG/DL (ref 0.5–1.3)
EGFRCR SERPLBLD CKD-EPI 2021: >90 ML/MIN/1.73M*2
EOSINOPHIL # BLD AUTO: 0 X10*3/UL (ref 0–0.4)
EOSINOPHIL NFR BLD AUTO: 0 %
ERYTHROCYTE [DISTWIDTH] IN BLOOD BY AUTOMATED COUNT: 14 % (ref 11.5–14.5)
GLUCOSE SERPL-MCNC: 167 MG/DL (ref 74–99)
HCT VFR BLD AUTO: 44.2 % (ref 41–52)
HGB BLD-MCNC: 14.2 G/DL (ref 13.5–17.5)
IMM GRANULOCYTES # BLD AUTO: 0.06 X10*3/UL (ref 0–0.5)
IMM GRANULOCYTES NFR BLD AUTO: 0.4 % (ref 0–0.9)
LYMPHOCYTES # BLD AUTO: 0.95 X10*3/UL (ref 0.8–3)
LYMPHOCYTES NFR BLD AUTO: 6.8 %
MAGNESIUM SERPL-MCNC: 2.2 MG/DL (ref 1.6–2.4)
MCH RBC QN AUTO: 29.9 PG (ref 26–34)
MCHC RBC AUTO-ENTMCNC: 32.1 G/DL (ref 32–36)
MCV RBC AUTO: 93 FL (ref 80–100)
MONOCYTES # BLD AUTO: 0.66 X10*3/UL (ref 0.05–0.8)
MONOCYTES NFR BLD AUTO: 4.7 %
NEUTROPHILS # BLD AUTO: 12.27 X10*3/UL (ref 1.6–5.5)
NEUTROPHILS NFR BLD AUTO: 88 %
NRBC BLD-RTO: 0 /100 WBCS (ref 0–0)
PLATELET # BLD AUTO: 244 X10*3/UL (ref 150–450)
POTASSIUM SERPL-SCNC: 4.8 MMOL/L (ref 3.5–5.3)
PROT SERPL-MCNC: 6.1 G/DL (ref 6.4–8.2)
RBC # BLD AUTO: 4.75 X10*6/UL (ref 4.5–5.9)
SODIUM SERPL-SCNC: 136 MMOL/L (ref 136–145)
WBC # BLD AUTO: 14 X10*3/UL (ref 4.4–11.3)

## 2024-06-13 PROCEDURE — 93458 L HRT ARTERY/VENTRICLE ANGIO: CPT | Mod: 59 | Performed by: STUDENT IN AN ORGANIZED HEALTH CARE EDUCATION/TRAINING PROGRAM

## 2024-06-13 PROCEDURE — 2500000001 HC RX 250 WO HCPCS SELF ADMINISTERED DRUGS (ALT 637 FOR MEDICARE OP): Performed by: NURSE PRACTITIONER

## 2024-06-13 PROCEDURE — 99153 MOD SED SAME PHYS/QHP EA: CPT | Performed by: STUDENT IN AN ORGANIZED HEALTH CARE EDUCATION/TRAINING PROGRAM

## 2024-06-13 PROCEDURE — 92920 PRQ TRLUML C ANGIOP 1ART&/BR: CPT | Mod: 74 | Performed by: STUDENT IN AN ORGANIZED HEALTH CARE EDUCATION/TRAINING PROGRAM

## 2024-06-13 PROCEDURE — C1769 GUIDE WIRE: HCPCS | Performed by: STUDENT IN AN ORGANIZED HEALTH CARE EDUCATION/TRAINING PROGRAM

## 2024-06-13 PROCEDURE — 2500000004 HC RX 250 GENERAL PHARMACY W/ HCPCS (ALT 636 FOR OP/ED): Performed by: STUDENT IN AN ORGANIZED HEALTH CARE EDUCATION/TRAINING PROGRAM

## 2024-06-13 PROCEDURE — 99152 MOD SED SAME PHYS/QHP 5/>YRS: CPT | Performed by: STUDENT IN AN ORGANIZED HEALTH CARE EDUCATION/TRAINING PROGRAM

## 2024-06-13 PROCEDURE — 85025 COMPLETE CBC W/AUTO DIFF WBC: CPT | Performed by: INTERNAL MEDICINE

## 2024-06-13 PROCEDURE — 2500000004 HC RX 250 GENERAL PHARMACY W/ HCPCS (ALT 636 FOR OP/ED): Performed by: NURSE PRACTITIONER

## 2024-06-13 PROCEDURE — 2720000007 HC OR 272 NO HCPCS: Performed by: STUDENT IN AN ORGANIZED HEALTH CARE EDUCATION/TRAINING PROGRAM

## 2024-06-13 PROCEDURE — 7100000009 HC PHASE TWO TIME - INITIAL BASE CHARGE: Performed by: STUDENT IN AN ORGANIZED HEALTH CARE EDUCATION/TRAINING PROGRAM

## 2024-06-13 PROCEDURE — 7100000010 HC PHASE TWO TIME - EACH INCREMENTAL 1 MINUTE: Performed by: STUDENT IN AN ORGANIZED HEALTH CARE EDUCATION/TRAINING PROGRAM

## 2024-06-13 PROCEDURE — 4A023N7 MEASUREMENT OF CARDIAC SAMPLING AND PRESSURE, LEFT HEART, PERCUTANEOUS APPROACH: ICD-10-PCS | Performed by: STUDENT IN AN ORGANIZED HEALTH CARE EDUCATION/TRAINING PROGRAM

## 2024-06-13 PROCEDURE — C1887 CATHETER, GUIDING: HCPCS | Performed by: STUDENT IN AN ORGANIZED HEALTH CARE EDUCATION/TRAINING PROGRAM

## 2024-06-13 PROCEDURE — 1200000002 HC GENERAL ROOM WITH TELEMETRY DAILY

## 2024-06-13 PROCEDURE — 2500000001 HC RX 250 WO HCPCS SELF ADMINISTERED DRUGS (ALT 637 FOR MEDICARE OP)

## 2024-06-13 PROCEDURE — B2111ZZ FLUOROSCOPY OF MULTIPLE CORONARY ARTERIES USING LOW OSMOLAR CONTRAST: ICD-10-PCS | Performed by: STUDENT IN AN ORGANIZED HEALTH CARE EDUCATION/TRAINING PROGRAM

## 2024-06-13 PROCEDURE — 2500000002 HC RX 250 W HCPCS SELF ADMINISTERED DRUGS (ALT 637 FOR MEDICARE OP, ALT 636 FOR OP/ED): Performed by: INTERNAL MEDICINE

## 2024-06-13 PROCEDURE — 2500000005 HC RX 250 GENERAL PHARMACY W/O HCPCS: Performed by: STUDENT IN AN ORGANIZED HEALTH CARE EDUCATION/TRAINING PROGRAM

## 2024-06-13 PROCEDURE — 2500000001 HC RX 250 WO HCPCS SELF ADMINISTERED DRUGS (ALT 637 FOR MEDICARE OP): Performed by: INTERNAL MEDICINE

## 2024-06-13 PROCEDURE — 92920 PRQ TRLUML C ANGIOP 1ART&/BR: CPT | Performed by: STUDENT IN AN ORGANIZED HEALTH CARE EDUCATION/TRAINING PROGRAM

## 2024-06-13 PROCEDURE — 2550000001 HC RX 255 CONTRASTS: Performed by: STUDENT IN AN ORGANIZED HEALTH CARE EDUCATION/TRAINING PROGRAM

## 2024-06-13 PROCEDURE — C1760 CLOSURE DEV, VASC: HCPCS | Performed by: STUDENT IN AN ORGANIZED HEALTH CARE EDUCATION/TRAINING PROGRAM

## 2024-06-13 PROCEDURE — 83735 ASSAY OF MAGNESIUM: CPT | Performed by: INTERNAL MEDICINE

## 2024-06-13 PROCEDURE — 93458 L HRT ARTERY/VENTRICLE ANGIO: CPT | Performed by: STUDENT IN AN ORGANIZED HEALTH CARE EDUCATION/TRAINING PROGRAM

## 2024-06-13 PROCEDURE — 94640 AIRWAY INHALATION TREATMENT: CPT | Mod: MUE

## 2024-06-13 PROCEDURE — 36415 COLL VENOUS BLD VENIPUNCTURE: CPT | Performed by: INTERNAL MEDICINE

## 2024-06-13 PROCEDURE — 84075 ASSAY ALKALINE PHOSPHATASE: CPT | Performed by: INTERNAL MEDICINE

## 2024-06-13 PROCEDURE — 2500000004 HC RX 250 GENERAL PHARMACY W/ HCPCS (ALT 636 FOR OP/ED)

## 2024-06-13 PROCEDURE — 99233 SBSQ HOSP IP/OBS HIGH 50: CPT | Performed by: STUDENT IN AN ORGANIZED HEALTH CARE EDUCATION/TRAINING PROGRAM

## 2024-06-13 PROCEDURE — C1894 INTRO/SHEATH, NON-LASER: HCPCS | Performed by: STUDENT IN AN ORGANIZED HEALTH CARE EDUCATION/TRAINING PROGRAM

## 2024-06-13 RX ORDER — MIDAZOLAM HYDROCHLORIDE 5 MG/ML
INJECTION, SOLUTION INTRAMUSCULAR; INTRAVENOUS AS NEEDED
Status: DISCONTINUED | OUTPATIENT
Start: 2024-06-13 | End: 2024-06-13 | Stop reason: HOSPADM

## 2024-06-13 RX ORDER — HEPARIN SODIUM 1000 [USP'U]/ML
INJECTION, SOLUTION INTRAVENOUS; SUBCUTANEOUS AS NEEDED
Status: DISCONTINUED | OUTPATIENT
Start: 2024-06-13 | End: 2024-06-13 | Stop reason: HOSPADM

## 2024-06-13 RX ORDER — FENTANYL CITRATE 50 UG/ML
INJECTION, SOLUTION INTRAMUSCULAR; INTRAVENOUS AS NEEDED
Status: DISCONTINUED | OUTPATIENT
Start: 2024-06-13 | End: 2024-06-13 | Stop reason: HOSPADM

## 2024-06-13 RX ORDER — VERAPAMIL HYDROCHLORIDE 2.5 MG/ML
INJECTION, SOLUTION INTRAVENOUS AS NEEDED
Status: DISCONTINUED | OUTPATIENT
Start: 2024-06-13 | End: 2024-06-13 | Stop reason: HOSPADM

## 2024-06-13 RX ORDER — MIDAZOLAM HYDROCHLORIDE 1 MG/ML
INJECTION, SOLUTION INTRAMUSCULAR; INTRAVENOUS AS NEEDED
Status: DISCONTINUED | OUTPATIENT
Start: 2024-06-13 | End: 2024-06-13 | Stop reason: HOSPADM

## 2024-06-13 RX ORDER — SODIUM CHLORIDE 9 MG/ML
1 INJECTION, SOLUTION INTRAVENOUS CONTINUOUS
Status: ACTIVE | OUTPATIENT
Start: 2024-06-13 | End: 2024-06-14

## 2024-06-13 RX ORDER — SODIUM CHLORIDE 9 MG/ML
INJECTION, SOLUTION INTRAVENOUS CONTINUOUS PRN
Status: DISCONTINUED | OUTPATIENT
Start: 2024-06-13 | End: 2024-06-13 | Stop reason: HOSPADM

## 2024-06-13 RX ORDER — ASPIRIN 325 MG
325 TABLET ORAL DAILY
Status: DISCONTINUED | OUTPATIENT
Start: 2024-06-13 | End: 2024-06-14 | Stop reason: HOSPADM

## 2024-06-13 RX ORDER — NITROGLYCERIN 5 MG/ML
INJECTION, SOLUTION INTRAVENOUS AS NEEDED
Status: DISCONTINUED | OUTPATIENT
Start: 2024-06-13 | End: 2024-06-13 | Stop reason: HOSPADM

## 2024-06-13 RX ORDER — LIDOCAINE HYDROCHLORIDE 20 MG/ML
INJECTION, SOLUTION INFILTRATION; PERINEURAL AS NEEDED
Status: DISCONTINUED | OUTPATIENT
Start: 2024-06-13 | End: 2024-06-13 | Stop reason: HOSPADM

## 2024-06-13 RX ADMIN — ALBUTEROL SULFATE 2.5 MG: 2.5 SOLUTION RESPIRATORY (INHALATION) at 19:31

## 2024-06-13 RX ADMIN — SACUBITRIL AND VALSARTAN 1 TABLET: 24; 26 TABLET, FILM COATED ORAL at 21:20

## 2024-06-13 RX ADMIN — CARVEDILOL 6.25 MG: 3.12 TABLET, FILM COATED ORAL at 21:20

## 2024-06-13 RX ADMIN — CLOPIDOGREL 75 MG: 75 TABLET ORAL at 09:41

## 2024-06-13 RX ADMIN — METHYLPREDNISOLONE SODIUM SUCCINATE 40 MG: 40 INJECTION, POWDER, FOR SOLUTION INTRAMUSCULAR; INTRAVENOUS at 15:43

## 2024-06-13 RX ADMIN — SODIUM CHLORIDE 1 ML/KG/HR: 9 INJECTION, SOLUTION INTRAVENOUS at 15:45

## 2024-06-13 RX ADMIN — ALBUTEROL SULFATE 2.5 MG: 2.5 SOLUTION RESPIRATORY (INHALATION) at 07:48

## 2024-06-13 RX ADMIN — Medication 1000 UNITS: at 08:18

## 2024-06-13 RX ADMIN — EMPAGLIFLOZIN 12.5 MG: 10 TABLET, FILM COATED ORAL at 08:18

## 2024-06-13 RX ADMIN — BENZONATATE 100 MG: 100 CAPSULE ORAL at 21:20

## 2024-06-13 RX ADMIN — DOXYCYCLINE 100 MG: 100 INJECTION, POWDER, LYOPHILIZED, FOR SOLUTION INTRAVENOUS at 23:51

## 2024-06-13 RX ADMIN — CARVEDILOL 6.25 MG: 3.12 TABLET, FILM COATED ORAL at 08:17

## 2024-06-13 RX ADMIN — CYANOCOBALAMIN TAB 1000 MCG 1000 MCG: 1000 TAB at 08:18

## 2024-06-13 RX ADMIN — BENZONATATE 100 MG: 100 CAPSULE ORAL at 08:18

## 2024-06-13 RX ADMIN — SACUBITRIL AND VALSARTAN 1 TABLET: 24; 26 TABLET, FILM COATED ORAL at 08:17

## 2024-06-13 RX ADMIN — ASPIRIN 325 MG ORAL TABLET 325 MG: 325 PILL ORAL at 09:41

## 2024-06-13 RX ADMIN — DOXYCYCLINE 100 MG: 100 INJECTION, POWDER, LYOPHILIZED, FOR SOLUTION INTRAVENOUS at 17:51

## 2024-06-13 ASSESSMENT — PAIN SCALES - GENERAL

## 2024-06-13 ASSESSMENT — PAIN - FUNCTIONAL ASSESSMENT
PAIN_FUNCTIONAL_ASSESSMENT: 0-10

## 2024-06-13 NOTE — NURSING NOTE
Report called to 9th floor RN-VSS, denies pain or nausea. Right radial site clean dry and intact with no evidence of bleeding or hematoma noted-small bruising noted right at puncture site but stable. Site dressed with telfa and pressure tape dressing and armboard applied per patient request. Ok to transfer at this time.

## 2024-06-13 NOTE — PROGRESS NOTES
"Subjective Data:  Patient underwent left heart catheterization with me this morning     Objective Data:  Last Recorded Vitals:  Vitals:    06/13/24 0354 06/13/24 0618 06/13/24 0733 06/13/24 0748   BP: 120/62  127/73    BP Location:       Patient Position:       Pulse: 61  60    Resp: 16      Temp: 36 °C (96.8 °F)  35.8 °C (96.4 °F)    TempSrc: Temporal      SpO2: 91%  94% 99%   Weight:  78.5 kg (173 lb 1 oz)     Height:           Last Labs:  CBC - 6/13/2024:  4:50 AM  14.0 14.2 244    44.2      CMP - 6/13/2024:  4:50 AM  9.0 6.1 10 --- 0.4   _ 3.9 14 52      PTT - No results in last year.  _   _ _     TROPHS   Date/Time Value Ref Range Status   06/11/2024 05:22 PM 6 0 - 20 ng/L Final   06/11/2024 04:01 PM 9 0 - 20 ng/L Final     BNP   Date/Time Value Ref Range Status   06/11/2024 04:01  0 - 99 pg/mL Final     LDLCALC   Date/Time Value Ref Range Status   06/12/2024 04:52  <=99 mg/dL Final     Comment:                                 Near   Borderline      AGE      Desirable  Optimal    High     High     Very High     0-19 Y     0 - 109     ---    110-129   >/= 130     ----    20-24 Y     0 - 119     ---    120-159   >/= 160     ----      >24 Y     0 -  99   100-129  130-159   160-189     >/=190       VLDL   Date/Time Value Ref Range Status   06/12/2024 04:52 AM 14 0 - 40 mg/dL Final      Last I/O:  I/O last 3 completed shifts:  In: 2400 (30.6 mL/kg) [P.O.:2200; I.V.:50 (0.6 mL/kg); IV Piggyback:150]  Out: 1100 (14 mL/kg) [Urine:1100 (0.4 mL/kg/hr)]  Weight: 78.5 kg     Past Cardiology Tests (Last 3 Years):  EKG:  ECG 12 lead 06/11/2024 (Preliminary)    Echo:  Transthoracic Echo (TTE) Complete 06/12/2024    Ejection Fractions:  No results found for: \"EF\"  Cath:  Cardiac Catheterization Procedure 06/13/2024    Stress Test:  No results found for this or any previous visit from the past 1095 days.    Cardiac Imaging:  No results found for this or any previous visit from the past 1095 days.      Inpatient " Medications:  Scheduled medications   Medication Dose Route Frequency    albuterol  2.5 mg nebulization TID    [Held by provider] apixaban  5 mg oral BID    aspirin  325 mg oral Daily    atorvastatin  40 mg oral Daily    carvedilol  6.25 mg oral BID    cholecalciferol  1,000 Units oral Daily    clopidogrel  75 mg oral Daily    cyanocobalamin  1,000 mcg oral Daily    doxycycline  100 mg intravenous q12h    empagliflozin  12.5 mg oral Daily    methylPREDNISolone sodium succinate (PF)  40 mg intravenous q24h    perflutren lipid microspheres  0.5-10 mL of dilution intravenous Once in imaging    perflutren protein A microsphere  0.5 mL intravenous Once in imaging    sacubitriL-valsartan  1 tablet oral BID    sulfur hexafluoride microsphr  2 mL intravenous Once in imaging     PRN medications   Medication    acetaminophen    Or    acetaminophen    Or    acetaminophen    benzonatate    ipratropium-albuteroL    nicotine    oxygen    oxygen     Continuous Medications   Medication Dose Last Rate    sodium chloride 0.9%  1 mL/kg/hr         Physical Exam:  Expand All Collapse All    Inpatient consult to Cardiology  Consult performed by: Anthony Mendez DO  Consult ordered by: Fernando Isaac DO              Reason For Consult  Syncope      History Of Present Illness  Osmany Michele is a 73 y.o. male with medical history significant for CAD status post multiple stents, sick sinus syndrome status post PPM, status post aortic repair/graft, nicotine use disorder, COPD who initially presented to [facility] due to reported chest pain and syncope.  Patient noted he believes his chest pain was likely secondary to his cough that he was having and also noted he was having increased use of his rescue inhaler and nebulizer at home because of the cough which is why he believes he had chest pain.  In addition, patient also noted that he had a syncopal episode where he did admit that he lost consciousness and hit his head.  Patient does not know  why he had a syncopal episode he said that he was sitting at the edge of his bed changing and then suddenly felt very dizzy and next thing he knew he was on the floor.  At time of syncopal episode, patient denied any chest pain, diaphoresis and did not think his ICD device fired.  Cardiology team consulted for interrogation of his ICD device.  1.           Past Medical History  He has no past medical history on file.     Surgical History  He has no past surgical history on file.     Social History  He has no history on file for tobacco use, alcohol use, and drug use.     Family History  Family History   No family history on file.        Allergies  Azithromycin, Effexor [venlafaxine], and Baclofen     Review of Systems  10 point ROS negfative aside from HPI         Physical Exam  General:  Pleasant and cooperative. No apparent distress.  HEENT:  Normocephalic, atraumatic, mucus membranes moist.   Neck:  Trachea midline.  No JVD.    Chest: Mild bilateral wheezing present.  No crackles.  CV:  Regular rate and rhythm.  Positive S1/S2.   Abdomen: Bowel sounds present in all four quadrants, abdomen is soft, non-tender, non-distended.  Extremities:  No lower extremity edema or cyanosis.   Neurological:  AAOx3. No focal deficits.  Skin:  Warm and dry.         Last Recorded Vitals  /66 (BP Location: Left arm, Patient Position: Standing)   Pulse 72   Temp 36.5 °C (97.7 °F) (Temporal)   Resp 18   Wt 78 kg (172 lb)   SpO2 93%      Relevant Results  Recent Results         Recent Results (from the past 12 hour(s))   Lipid Panel     Collection Time: 06/12/24  4:52 AM   Result Value Ref Range     Cholesterol 160 0 - 199 mg/dL     HDL-Cholesterol 27.6 mg/dL     Cholesterol/HDL Ratio 5.8       LDL Calculated 119 (H) <=99 mg/dL     VLDL 14 0 - 40 mg/dL     Triglycerides 68 0 - 149 mg/dL     Non HDL Cholesterol 132 0 - 149 mg/dL   CBC     Collection Time: 06/12/24  4:52 AM   Result Value Ref Range     WBC 5.7 4.4 - 11.3  x10*3/uL     nRBC 0.0 0.0 - 0.0 /100 WBCs     RBC 4.80 4.50 - 5.90 x10*6/uL     Hemoglobin 13.9 13.5 - 17.5 g/dL     Hematocrit 44.9 41.0 - 52.0 %     MCV 94 80 - 100 fL     MCH 29.0 26.0 - 34.0 pg     MCHC 31.0 (L) 32.0 - 36.0 g/dL     RDW 14.1 11.5 - 14.5 %     Platelets 231 150 - 450 x10*3/uL   Basic Metabolic Panel     Collection Time: 06/12/24  4:52 AM   Result Value Ref Range     Glucose 195 (H) 74 - 99 mg/dL     Sodium 139 136 - 145 mmol/L     Potassium 4.6 3.5 - 5.3 mmol/L     Chloride 106 98 - 107 mmol/L     Bicarbonate 29 21 - 32 mmol/L     Anion Gap 9 (L) 10 - 20 mmol/L     Urea Nitrogen 20 6 - 23 mg/dL     Creatinine 0.72 0.50 - 1.30 mg/dL     eGFR >90 >60 mL/min/1.73m*2     Calcium 8.4 (L) 8.6 - 10.3 mg/dL   Carotid duplex bilateral     Collection Time: 06/12/24  8:34 AM   Result Value Ref Range     BSA 2 m2   Transthoracic Echo (TTE) Complete     Collection Time: 06/12/24  8:56 AM   Result Value Ref Range     AV pk darshana 1.03 m/s     LVOT diam 2.00 cm     LV Biplane EF 37 %     MV E/A ratio 0.45       Tricuspid annular plane systolic excursion 1.9 cm     RV free wall pk S' 7.72 cm/s     LV GLS 9.3 %     LVIDd 5.80 cm     RVSP 25.5 mmHg     Aortic Valve Area by Continuity of Peak Velocity 3.04 cm2     AV pk grad 4.2 mmHg     LV A4C EF 42.6                 Assessment/Plan   Episode of VT followed by syncope.  Patient's ICD device was interrogated and showed that he had a run of VT shortly before he had a syncopal episode.  Will plan for cardiac catheterization tomorrow to evaluate his coronaries and need for stent placement.  CAD status post multiple stents.  Hold anticoagulation and plan for cardiac catheterization tomorrow.  Nicotine use.  Encourage patient on tobacco cessation as this is contributing to his cardiopulmonary disease.     Anthony Meruva, DO                         Attestation signed by Jeronimo Moe MD PhD at 6/12/2024  1:44 PM     Patient was found to have an episode of VT that  corresponds to the time he had the episode of passing out at home.  This was below the threshold of the therapy and therefore he did not receive ATP or shock.  It appears that he is did have another episode in April.  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  Especially does have also have chest pain symptoms.  I discussed with the patient including risks and benefits and he is in agreement with that.  Will hold Eliquis for now.  N.p.o. after midnight.           Jeronimo Moe MD, PhD, FAC, [facility]  Interventional Cardiology, [facility]  Associate Professor of Medicine, [facility]  Office: [phone]                       June 13, 2024  Patient underwent left heart catheterization and coronary angiogram with me this morning.  He was found to have patent stents in RCA with nonobstructive ISR.  Patient was found to have proximal left circumflex  with collaterals from left to left and reconstitution of OM.  His recent VT episodes are more likely is scar tissue mediated and appropriate for medical therapy at this point.  We can resume his Eliquis from tomorrow morning.  We will continue with Plavix and Eliquis.  Will continue with the statin, beta-blocker and his Farxiga and Entresto.  I will ask Dr. Ramicone to follow-up with the patient tomorrow and decide if any adjustment on his device is needed.  Otherwise patient should be able to be ready for discharge possibly tomorrow with [facility] care.

## 2024-06-13 NOTE — H&P
History and Physical   Pre Surgical Review (< 30 days)      History & Physical Reviewed    I have reviewed the History and Physical dated:  6/11/24   History and Physical reviewed and relevant findings noted. Patient examined to review pertinent physical  findings.: No significant changes   Home Medications Reviewed: see EMR.   Allergies Reviewed: no changes noted      Home Medications  Current Outpatient Medications   Medication Instructions    albuterol 90 mcg/actuation inhaler 1-2 puffs, inhalation, Every 6 hours PRN    albuterol 2.5 mg, nebulization, As needed    apixaban (ELIQUIS) 5 mg, oral, 2 times daily    atorvastatin (LIPITOR) 40 mg, oral, Daily    carvedilol (COREG) 6.25 mg, oral, 2 times daily    cholecalciferol (VITAMIN D-3) 25 mcg, oral, Daily    clopidogrel (PLAVIX) 75 mg, oral, Daily    cyanocobalamin (VITAMIN B-12) 1,000 mcg, oral, Daily    empagliflozin (Jardiance) 25 mg 0.5 tablets, oral, Daily    sacubitriL-valsartan (Entresto) 24-26 mg tablet 1 tablet, oral, 2 times daily    spironolactone (Aldactone) 25 mg tablet 0.5 tablets, oral, Daily        Allergies  Allergies   Allergen Reactions    Azithromycin Itching    Effexor [Venlafaxine] Unknown     [facility]    Baclofen Dizziness and Nausea/vomiting     [facility]       Physical Exam  Physical Exam  Constitutional:       General: He is not in acute distress.  Cardiovascular:      Rate and Rhythm: Normal rate and regular rhythm.      Comments: + pulses all extremities.  Pulmonary:      Effort: Pulmonary effort is normal. No respiratory distress.      Comments: Diminished bilaterally with expiratory wheezes.  Abdominal:      General: Bowel sounds are normal.   Skin:     Findings: Bruising present.   Neurological:      General: No focal deficit present.      Mental Status: He is alert and oriented to person, place, and time.   Psychiatric:         Mood and Affect: Mood normal.         Behavior: Behavior normal.        Airway/Sedation  Assessment     Assessment by anesthesia N/A     ·  Mouth Opening OK yes      Neck Flexibility OK yes      Loose Teeth No   ·  Oropharyngeal Classification Grade II   ·  ASA PS Classification ASA III - Patient with severe systemic disease       Sedation Plan Moderate     Risks, benefits, and alternatives discussed with patient.     ERAS (Enhanced Recovery After Surgery):  ·  ERAS Patient: No      Consent:   COVID-19 Consent:  ·  COVID-19 Risk Consent Surgeon has reviewed key risks related to the risk of debbie COVID-19 and if they contract COVID-19 what the risks are.     Denise Devine, APRN-CNP

## 2024-06-13 NOTE — POST-PROCEDURE NOTE
Physician Transition of Care Summary  Invasive Cardiovascular Lab    Procedure Date: 6/13/2024  Attending:    * Jeronimo Moe - Primary  Complications:  No in-lab complications observed.     Cardiac Cath Post Procedure Notes:  Post Procedure Diagnosis: Coronary artery disease as above.  Blood Loss:               Estimated blood loss during the procedure was 5 mls.  Specimens Removed:        Number of specimen(s) removed: none.        Recommendations:  Maximize medical therapy.  Agressive risk factor modification efforts.  Follow-up with cardiology clinic.  Lipid lowering agent or Statin therapy.  Medical management of coronary artery disease.  EP consult.  Aspirin therapy.  Beta blocker therapy.     ____________________________________________________________________________________  CONCLUSIONS:   1. Short left main without significant stenosis            Luminal irregularities throughout LAD with mid LAD after the first septal branch focal 40% stenosis            Proximal left circumflex 100% occlusion with collaterals and reconstitution of OM from left to left            Patent stents from proximal to distal RCA with diffuse 30 to 40% ISR            Right dominant coronary artery system            LVEDP 31 mmHg without significant gradient across aortic valve.       Please refer to the full report that is in the system.    Electronically signed by: Jeronimo Moe MD PhD, 6/13/2024 11:25 AM   Colchicine Counseling:  Patient counseled regarding adverse effects including but not limited to stomach upset (nausea, vomiting, stomach pain, or diarrhea).  Patient instructed to limit alcohol consumption while taking this medication.  Colchicine may reduce blood counts especially with prolonged use.  The patient understands that monitoring of kidney function and blood counts may be required, especially at baseline. The patient verbalized understanding of the proper use and possible adverse effects of colchicine.  All of the patient's questions and concerns were addressed.

## 2024-06-13 NOTE — PROGRESS NOTES
"Osmany Michele is a 73 y.o. male on day 1 of admission presenting with Syncope and collapse.      Subjective   Duplex unremarkable. Patient underwent heart cath this AM, with results as follows: \"He was found to have patent stents in RCA with nonobstructive ISR. Patient was found to have proximal left circumflex  with collaterals from left to left and reconstitution of OM.  His recent VT episodes are more likely is scar tissue mediated and appropriate for medical therapy at this point. We can resume his Eliquis from tomorrow morning. We will continue with Plavix and Eliquis.Will continue with the statin, beta-blocker and his Farxiga and Entresto. I will ask Dr. Ramicone to follow-up with the patient tomorrow and decide if any adjustment on his device is needed.  Otherwise patient should be able to be ready for discharge possibly tomorrow with [facility].\"     Neurology to sign off on care and patient to follow-up with cardiology as previously noted.          Objective     Last Recorded Vitals  Blood pressure 127/73, pulse 60, temperature 35.8 °C (96.4 °F), resp. rate 16, height 1.854 m (6' 1\"), weight 78.5 kg (173 lb 1 oz), SpO2 99%.    Relevant Results  Scheduled medications  albuterol, 2.5 mg, nebulization, TID  [Held by provider] apixaban, 5 mg, oral, BID  aspirin, 325 mg, oral, Daily  atorvastatin, 40 mg, oral, Daily  carvedilol, 6.25 mg, oral, BID  cholecalciferol, 1,000 Units, oral, Daily  clopidogrel, 75 mg, oral, Daily  cyanocobalamin, 1,000 mcg, oral, Daily  doxycycline, 100 mg, intravenous, q12h  empagliflozin, 12.5 mg, oral, Daily  methylPREDNISolone sodium succinate (PF), 40 mg, intravenous, q24h  perflutren lipid microspheres, 0.5-10 mL of dilution, intravenous, Once in imaging  perflutren protein A microsphere, 0.5 mL, intravenous, Once in imaging  sacubitriL-valsartan, 1 tablet, oral, BID  sulfur hexafluoride microsphr, 2 mL, intravenous, Once " in imaging      Continuous medications  sodium chloride 0.9%, 1 mL/kg/hr      PRN medications  PRN medications: acetaminophen **OR** acetaminophen **OR** acetaminophen, benzonatate, ipratropium-albuteroL, nicotine, oxygen, oxygen  Cardiac Catheterization Procedure    Result Date: 6/13/2024   [facility], Cath Lab, [address] [place] [id number] Cardiovascular Catheterization Report Patient Name:      EMILY ROSS      Performing Physician:  Angelic Moe MD Study Date:        6/13/2024             Verifying Physician:   Angelic Moe MD MRN/PID:           [id number]              Cardiologist/Co-Scrub: Accession#:        [id number]          Ordering Provider:     Angelic MOE Date of Birth/Age: 1950 / 73 years Cardiologist: Gender:            M                     Fellow: Encounter#:        [id number]            Surgeon:  Study: Left Heart Cath  Indications: EMILY ROSS is a 73 year old male who presents with dyslipidemia, prior myocardial infarction, prior percutaneous coronary intervention, prior aorta intervention and a chest pain assessment of atypical angina. Syncope and cardiac arrhythmia. Medical History: Stress test performed: No. CTA performed: No. [place]atston accessed: No. LVEF Assessed: Yes. Cardiac arrest: No. Cardiac surgical consult: No. Cardiovascular instability: No. Frailty status of patient entering lab: 3 = Managing well.  Procedure Description Comments: Informed consent was obtained. Patient was brought to the Cath Lab and prepped and draped in usual fashion. Under moderate sedation and after giving 2% local lidocaine, right radial access was obtained and a 6 Togolese sheath was placed in. Radial cocktail was given. Using a Leslie catheter coronary  angiography and left heart catheterization were performed.  Coronary Angiography: The coronary circulation is right dominant.  Coronary Angiography Comments: Short left main without significant stenosis Luminal irregularities throughout LAD with mid LAD after the first septal branch focal 40% stenosis Proximal left circumflex 100% occlusion with collaterals and reconstitution of OM from left to left Patent stents from proximal to distal RCA with diffuse 30 to 40% ISR Right dominant coronary artery system LVEDP 31 mmHg without significant gradient across aortic valve.  Coronary Interventions: Angiography reveals a 100% stenosis of the proximal circumflex coronary artery. Pre-intervention NOA flow was 0. Percutaneous coronary intervention was performed within the proximal circumflex. The stenosis was successfully reduced from 100% to 100%. Post-intervention NOA flow was 0. From the angiogram it was not clear if the proximal left circumflex lesion was a subacute lesion or a . Therefore, I decided to try angioplasty on it considering the patient recent ventricular tachycardia. He is already on aspirin and Plavix. Extra heparin was given. A 6 Kyrgyz EBU 3.5 guide catheter was used to engage the left coronary artery. Using 2 run-through wires I tried to cross the lesion but did not go. Therefore, lesion was never crossed and angioplasty was not performed. This suggests highly that the lesion in proximal left circumflex is . Patient has good collaterals from left to left and reconstitution of OM. Therefore, I believe it is best to manage this  medically. A TR band was used to obtain hemostasis in the right wrist. Patient tolerated procedure well and transferred to recovery area for further care monitoring.  Hemo Personnel: +-----------------------+---------+ Name                   Duty      +-----------------------+---------+ Jeronimo Moe MD 1 +-----------------------+---------+  Hemodynamic  Pressures:  +----+---------------+----------+-------------+--------------+-------+---------+ Site   Date Time   Phase NameSystolic mmHgDiastolic mmHgED mmHgMean mmHg +----+---------------+----------+-------------+--------------+-------+---------+   LV      6/13/2024   O2 REST           75            21     24                  10:27:51 AM                                                      +----+---------------+----------+-------------+--------------+-------+---------+   LV      6/13/2024   O2 REST          101            13     27                  10:27:58 AM                                                      +----+---------------+----------+-------------+--------------+-------+---------+  LVp      6/13/2024   O2 REST           90            17     31                  10:28:02 AM                                                      +----+---------------+----------+-------------+--------------+-------+---------+  AOp      6/13/2024   O2 REST           99            66              80         10:28:07 AM                                                      +----+---------------+----------+-------------+--------------+-------+---------+   AO      6/13/2024   O2 REST          113            66              78         10:28:18 AM                                                      +----+---------------+----------+-------------+--------------+-------+---------+  Complications: No in-lab complications observed.  Cardiac Cath Post Procedure Notes: Post Procedure Diagnosis: Coronary artery disease as above. Blood Loss:               Estimated blood loss during the procedure was 5 mls. Specimens Removed:        Number of specimen(s) removed: none.  Recommendations: Maximize medical therapy. Agressive risk factor modification efforts. Follow-up with cardiology clinic. Lipid lowering agent or Statin therapy. Medical management of  coronary artery disease. EP consult. Aspirin therapy. Beta blocker therapy. ____________________________________________________________________________________ CONCLUSIONS:  1. Short left main without significant stenosis         Luminal irregularities throughout LAD with mid LAD after the first septal branch focal 40% stenosis         Proximal left circumflex 100% occlusion with collaterals and reconstitution of OM from left to left         Patent stents from proximal to distal RCA with diffuse 30 to 40% ISR         Right dominant coronary artery system         LVEDP 31 mmHg without significant gradient across aortic valve. ICD 10 Codes: Ventricular tachycardia, other-I47.29  CPT Codes: Left Heart Cath (visualization of coronaries) and LV-30975; Angioplasty, single Left Circumflex major Artery/branch (PCI)-60523.LC; Reduced Services, portion of procedure performed-MOD52; Moderate Sedation Services 1st additional 15 minutes patient >5 years-31589; Moderate Sedation Services 2nd additional 15 minutes patient >5 years-26673; Moderate Sedation Services 3rd additional 15 minutes patient >5 years-32300  [id number] Jeronimo Moe MD Performing Physician Electronically signed by [id number] Jeronimo Moe MD on 6/13/2024 at 11:24:22 AM  ** Final **     Carotid duplex bilateral    Result Date: 6/12/2024           [place] [address] Tel [phone] and Fax [phone]  Vascular Lab Report [facility] US CAROTID ARTERY DUPLEX BILATERAL  Patient Name:      EMILY Garcia Physician: [id number] Jonah Emmanuel MD Study Date:        6/12/2024            Ordering           [id number] JAVIER GARNER                                         Physician: MRN/PID:           [id number]             Technologist:      Angelika Gao RVT Accession#:        [id number]         Technologist 2: Date of Birth/Age: 1950 / 73      Encounter#:         [id number]                    years Gender:            M Admission Status:  Inpatient            Location           [facility]                                         Performed:  Diagnosis/ICD: Syncope and collapse-R55 CPT Codes:     50652 Cerebrovascular Carotid Duplex scan complete  CONCLUSIONS: Right Carotid: Findings are consistent with less than 50% stenosis of the right proximal internal carotid artery. Laminar flow seen by color Doppler. Right external carotid artery appears patent with no evidence of stenosis. No evidence of hemodynamically significant stenosis of the right common carotid artery. The right vertebral artery is patent with antegrade flow. No evidence of hemodynamically significant stenosis in the right subclavian artery. Left Carotid: Findings are consistent with less than 50% stenosis of the left proximal internal carotid artery. Laminar flow seen by color Doppler. Left external carotid artery appears patent with no evidence of stenosis. No evidence of hemodynamically significant stenosis of the left common carotid artery. The left vertebral artery is patent with antegrade flow. No evidence of hemodynamically significant stenosis in the left subclavian artery.  Imaging & Doppler Findings: Right Plaque Morph: The proximal right internal carotid artery demonstrates calcified plaque. The mid right common carotid artery demonstrates calcified plaque. The distal right common carotid artery demonstrates calcified plaque. Left Plaque Morph: The proximal left internal carotid artery demonstrates calcified plaque. The mid left common carotid artery demonstrates heterogenous and calcified plaque. The distal left common carotid artery demonstrates calcified plaque.   Right                       Left   PSV     EDV                PSV      EDV 68 cm/s           CCA P    65 cm/s 52 cm/s           CCA D    141 cm/s 53 cm/s 11 cm/s   ICA P    59 cm/s  14 cm/s 50 cm/s 15 cm/s   ICA M    53 cm/s  16  cm/s 50 cm/s 13 cm/s   ICA D    58 cm/s  16 cm/s 89 cm/s            ECA     71 cm/s 53 cm/s         Vertebral  49 cm/s 87 cm/s         Subclavian 130 cm/s               Right Left ICA/CCA Ratio  1.0  0.4   [id number] Jonah Emmanuel MD Electronically signed by [id number] Jonah Emmanuel MD on 6/12/2024 at 7:44:42 PM  ** Final **     Transthoracic Echo (TTE) Complete    Result Date: 6/12/2024    [facility], [address] [place] [id number] [phone] and                                 Fax [phone] TRANSTHORACIC ECHOCARDIOGRAM REPORT  Patient Name:      EMILY ROSS     Reading Physician:    [id number] Moe MD Study Date:        6/12/2024            Ordering Provider:    [id number] JAVIER GARNER MRN/PID:           [id number]             Fellow: Accession#:        [id number]         Nurse: Date of Birth/Age: 1950 / 73      Sonographer:          Faisal Lovelace RDCS                    years Gender:            M                    Additional Staff: Height:            185.42 cm            Admit Date:           6/11/2024 Weight:            78.02 kg             Admission Status:     Inpatient -                                                               Routine BSA / BMI:         2.02 m2 / 22.69      Encounter#:           [id number]                    kg/m2                                         Department Location:  [facility] Blood Pressure: 117 /56 mmHg Study Type:    TRANSTHORACIC ECHO (TTE) COMPLETE Diagnosis/ICD: Chest pain, unspecified-R07.9; Syncope-R55 CPT Code:      Echo Complete w Full Doppler-94980; Myocardial Strain                Imaging-14671 Patient History: Smoker:            Current. Pacer/Defib:       AICD/Perment pacemaker Pertinent History: CAD, COPD, CHF, Dyspnea and Chest Pain. PCI-stent x 2. Study Detail: The following Echo studies were performed: 2D,  M-Mode, Doppler,               color flow and Strain. Technically challenging study due to body               habitus and COPD.  PHYSICIAN INTERPRETATION: Left Ventricle: The left ventricular systolic function is moderately decreased, with an estimated ejection fraction of 35-40%. Wall motion is abnormal. The left ventricular cavity size is normal. Left Ventricular Global Longitudinal Strain - 9.3 %. Spectral Doppler shows an impaired relaxation pattern of left ventricular diastolic filling. Left Atrium: The left atrium is normal in size. Right Ventricle: The right ventricle is normal in size. There is normal right ventricular global systolic function. A device is visualized in the right ventricle. Right Atrium: The right atrium is normal in size. There is a device visualized in the right atrium. Aortic Valve: The aortic valve is probably trileaflet. There is no evidence of aortic valve regurgitation. The peak instantaneous gradient of the aortic valve is 4.2 mmHg. Mitral Valve: The mitral valve is normal in structure. There is trace mitral valve regurgitation. Tricuspid Valve: The tricuspid valve is structurally normal. There is trace tricuspid regurgitation. The Doppler estimated RVSP is slightly elevated at 25.5 mmHg. Pulmonic Valve: The pulmonic valve is structurally normal. There is trace pulmonic valve regurgitation. Pericardium: There is no pericardial effusion noted. Aorta: The aortic root is normal. There is mild dilatation of the aortic root.  CONCLUSIONS:  1. Left ventricular systolic function is moderately decreased with a 35-40% estimated ejection fraction.  2. Spectral Doppler shows an impaired relaxation pattern of left ventricular diastolic filling.  3. Slightly elevated RVSP. QUANTITATIVE DATA SUMMARY: 2D MEASUREMENTS:                           Normal Ranges: IVSd:          1.60 cm    (0.6-1.1cm) LVPWd:         1.10 cm    (0.6-1.1cm) LVIDd:         5.80 cm    (3.9-5.9cm) LV Mass Index: 173.0 g/m2 LA  VOLUME:                             Normal Ranges: LA Volume Index: 26.0 ml/m2 RA VOLUME BY A/L METHOD:                       Normal Ranges: RA Area A4C: 13.0 cm2 M-MODE MEASUREMENTS:                  Normal Ranges: Ao Root: 3.70 cm (2.0-3.7cm) LAs:     4.00 cm (2.7-4.0cm) LV SYSTOLIC FUNCTION BY 2D PLANIMETRY (MOD):                                          Normal Ranges: EF-A4C View:                      42.6 % (>=55%) EF-A2C View:                      32.0 % EF-Biplane:                       37.5 % Global Longitudinal Strain (GLS): 9.3 % LV DIASTOLIC FUNCTION:                           Normal Ranges: MV Peak E:    0.30 m/s    (0.7-1.2 m/s) MV Peak A:    0.67 m/s    (0.42-0.7 m/s) E/A Ratio:    0.45        (1.0-2.2) MV lateral e' 0.05 m/s MV medial e'  0.06 m/s MV A Dur:     155.00 msec MITRAL VALVE:                 Normal Ranges: MV DT: 197 msec (150-240msec) AORTIC VALVE:                         Normal Ranges: AoV Vmax:      1.03 m/s (<=1.7m/s) AoV Peak P.2 mmHg (<20mmHg) LVOT Max Oni:  1.00 m/s (<=1.1m/s) LVOT VTI:      18.20 cm LVOT Diameter: 2.00 cm  (1.8-2.4cm) AoV Area,Vmax: 3.04 cm2 (2.5-4.5cm2) AORTIC INSUFFICIENCY: AI Vmax:       4.60 m/s AI Half-time:  601 msec AI Decel Rate: 224.00 cm/s2  RIGHT VENTRICLE: RV Basal 3.30 cm RV Mid   2.10 cm RV Major 9.2 cm TAPSE:   19.3 mm RV s'    0.08 m/s TRICUSPID VALVE/RVSP:                             Normal Ranges: Peak TR Velocity: 2.37 m/s RV Syst Pressure: 25.5 mmHg (< 30mmHg) PULMONIC VALVE:                      Normal Ranges: PV Max Oni: 0.9 m/s  (0.6-0.9m/s) PV Max PG:  3.1 mmHg  [id number] Jeronimo oMe MD Electronically signed on 2024 at 9:05:10 AM  ** Final **     ECG 12 lead    Result Date: 2024  Atrial-paced rhythm RBBB and LPFB    CT head W O contrast trauma protocol    Result Date: 2024  Interpreted By:  Blaise Noonan, STUDY: CT HEAD W/O CONTRAST TRAUMA PROTOCOL; CT CERVICAL SPINE WO IV CONTRAST;  2024 4:47 pm    INDICATION: Signs/Symptoms:syncope, head trauma on eliquis.   COMPARISON: None.   ACCESSION NUMBER(S): [id number]; [id number]   ORDERING CLINICIAN: JOSE ALEJANDRO STEPHENS   TECHNIQUE: Noncontrast axial CT scan of head was performed, with coronal and sagittal reformats provided. The images were reviewed in bone, brain, blood and soft tissue windows.   Axial CT images of the cervical spine are obtained. Axial, coronal and sagittal reconstructions are provided for review.   FINDINGS: CT HEAD:   No hyperdense intracranial hemorrhage is identified. No mass effect or midline shift is present.   Gray-white differentiation is intact without evidence of acute CT apparent transcortical infarct. Small focus of encephalomalacia and gliosis is present in the right occipital lobe (series 203, image 54) likely representing sequela of prior infarct/injury.   No ventricular dilatation is present. Basal cisterns are patent. No extra-axial fluid collections are identified.   Scalp soft tissues do not demonstrate any acute abnormality. Calvarium is unremarkable. Mastoid air cells and middle ear cavities are clear.   Visualized paranasal sinuses are unremarkable in appearance.   CT C-SPINE:   There is slight reversal normal lordotic curvature of the cervical spine, with a 2-3 mm anterolisthesis of C7 on T1.   Cervical vertebral body heights are preserved without evidence of compression fractures, although mild multilevel insufficiency endplate changes are present with associated Schmorl's nodes, most pronounced along the superior endplate of C4, superior endplate of C5 and superior endplate of C6.   Craniocervical junction is intact. Facet joints are preserved without evidence of subluxation or perching. No evidence of acute trauma to the posterior elements of the cervical spine is identified. No displaced transverse or spinous process fracture is present.   Multilevel intervertebral disc height loss is present, moderate at C4-C5 and C5-C6.    No high-grade stenosis is identified in the cervical spine, although at least mild spinal canal narrowing is suspected at the levels of C4-C5 and C5-C6 due to disc osteophyte complexes and ligamentum flavum thickening.   Mild-to-moderate neural foraminal narrowing is present at the level of C4-C5 and C5-C6 bilaterally due to endplate spurring hypertrophic uncovertebral and facet joint changes.       CT HEAD: 1. No evidence of hemorrhage, skull fracture, or other acute intracranial trauma/abnormality. 2. Small geographic area of cystic encephalomalacia and gliosis is present in the right occipital lobe, likely representing sequela of prior infarct/injury.   CT C-SPINE: 1. No evidence of acute trauma to the cervical spine. 2. Multilevel degenerative changes of the cervical spine, with mild spinal canal narrowing suspected at the levels of C4-C5 and C5-C6 due to disc osteophyte complex and ligamentum flavum thickening mild-to-moderate neural foraminal stenosis suspected at C4-C5 and C5-C6 due to endplate spurring and hypertrophic uncovertebral and facet joint changes..   MACRO: None   Signed by: Blaise Noonan 6/11/2024 5:22 PM Dictation workstation:   [id number]    CT cervical spine wo IV contrast    Result Date: 6/11/2024  Interpreted By:  Blaise Noonan, STUDY: CT HEAD W/O CONTRAST TRAUMA PROTOCOL; CT CERVICAL SPINE WO IV CONTRAST;  6/11/2024 4:47 pm   INDICATION: Signs/Symptoms:syncope, head trauma on eliquis.   COMPARISON: None.   ACCESSION NUMBER(S): [id number]; [id number]   ORDERING CLINICIAN: JOSE ALEJANDRO STEPHENS   TECHNIQUE: Noncontrast axial CT scan of head was performed, with coronal and sagittal reformats provided. The images were reviewed in bone, brain, blood and soft tissue windows.   Axial CT images of the cervical spine are obtained. Axial, coronal and sagittal reconstructions are provided for review.   FINDINGS: CT HEAD:   No hyperdense intracranial hemorrhage is identified. No mass  effect or midline shift is present.   Gray-white differentiation is intact without evidence of acute CT apparent transcortical infarct. Small focus of encephalomalacia and gliosis is present in the right occipital lobe (series 203, image 54) likely representing sequela of prior infarct/injury.   No ventricular dilatation is present. Basal cisterns are patent. No extra-axial fluid collections are identified.   Scalp soft tissues do not demonstrate any acute abnormality. Calvarium is unremarkable. Mastoid air cells and middle ear cavities are clear.   Visualized paranasal sinuses are unremarkable in appearance.   CT C-SPINE:   There is slight reversal normal lordotic curvature of the cervical spine, with a 2-3 mm anterolisthesis of C7 on T1.   Cervical vertebral body heights are preserved without evidence of compression fractures, although mild multilevel insufficiency endplate changes are present with associated Schmorl's nodes, most pronounced along the superior endplate of C4, superior endplate of C5 and superior endplate of C6.   Craniocervical junction is intact. Facet joints are preserved without evidence of subluxation or perching. No evidence of acute trauma to the posterior elements of the cervical spine is identified. No displaced transverse or spinous process fracture is present.   Multilevel intervertebral disc height loss is present, moderate at C4-C5 and C5-C6.   No high-grade stenosis is identified in the cervical spine, although at least mild spinal canal narrowing is suspected at the levels of C4-C5 and C5-C6 due to disc osteophyte complexes and ligamentum flavum thickening.   Mild-to-moderate neural foraminal narrowing is present at the level of C4-C5 and C5-C6 bilaterally due to endplate spurring hypertrophic uncovertebral and facet joint changes.       CT HEAD: 1. No evidence of hemorrhage, skull fracture, or other acute intracranial trauma/abnormality. 2. Small geographic area of cystic  encephalomalacia and gliosis is present in the right occipital lobe, likely representing sequela of prior infarct/injury.   CT C-SPINE: 1. No evidence of acute trauma to the cervical spine. 2. Multilevel degenerative changes of the cervical spine, with mild spinal canal narrowing suspected at the levels of C4-C5 and C5-C6 due to disc osteophyte complex and ligamentum flavum thickening mild-to-moderate neural foraminal stenosis suspected at C4-C5 and C5-C6 due to endplate spurring and hypertrophic uncovertebral and facet joint changes..   MACRO: None   Signed by: Blaise Noonan 6/11/2024 5:22 PM Dictation workstation:   [id number]    CT angio chest abdomen pelvis    Result Date: 6/11/2024  Interpreted By:  Gordo Maldonado, STUDY: CT ANGIO CHEST ABDOMEN PELVIS; ;  6/11/2024 4:47 pm   CT ANGIOGRAM CHEST, ABDOMEN AND PELVIS WITHOUT AND WITH CONTRAST   INDICATION: Signs/Symptoms:h/o aorta repiair with CP and syncope. 73-year-old man with chest pain.   COMPARISON: Chest radiograph 06/11/2024   ACCESSION NUMBER(S): [id number]   ORDERING CLINICIAN: JOSE ALEJANDRO STEPHENS   TECHNIQUE: Contiguous acquired helical axial images were obtained of the chest, abdomen, and pelvis without and with intravascular contrast. Coronal and sagittal multiplanar reformatted maximum intensity projection images were obtained. 3D volumetric surface rendered representation of arterial structures was performed on a separate workstation and submitted for interpretation.   FINDINGS: Angiographic:   The heart size is enlarged. Right heart pacer leads enter the left subclavian vein adjacent to the left anterosuperior chest wall generator tract.   The examination is not optimized for sensitive evaluation of pulmonary arteries. There are no large central pulmonary artery filling defects.   The ascending thoracic aorta all and arch vessels are normal in caliber and patent. There is mild-to-moderate intimal irregularity with calcified and  noncalcified atherosclerotic plaque of the aortic arch which is nonaneurysmal by size criteria. There is irregularity of noncalcified atherosclerotic plaque of the descending thoracic aorta with few areas of of small positive the ventral wall penetrating atherosclerotic ulceration (for example image [id number]).   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 The celiac, superior mesenteric, and bilateral renal arteries are patent. The patient is status post infrarenal abdominal aorto bi-iliac stent graft placement with patency of graft components extending into the iliac arteries bilaterally. The origin of the inferior mesenteric artery has been covered however distal branches are opacified via collateralization.   The right common iliac artery stent graft component is patent. The right internal and external iliac arteries are patent. There is mild atherosclerotic narrowing of the right common femoral artery and the partially imaged upstream right superficial and profunda femoris arteries are patent.   The left common iliac artery stent graft component is patent. There is tortuosity of the left internal and external iliac arteries with associated moderate to severe narrowing of the left external iliac artery (image 521 of 633). Distally, the left internal and external iliac arteries are patent. The left common femoral arteries mildly atherosclerotic and patent. The partially imaged upstream left superficial and profunda femoris arteries are patent.   Large iliac veins the pelvis and the IVC are grossly unremarkable. The renal veins are patent. The portal, superior mesenteric, and splenic veins are patent. Hepatic veins are patent. Suspect high-grade stenosis or possible occlusion of the left brachiocephalic vein adjacent to pacer leads. The internal jugular veins are  patent bilaterally.       Nonangiographic:   The partially imaged thyroid is unremarkable. No supraclavicular or thoracic inlet lymphadenopathy. No pathologically enlarged mediastinal or hilar lymph nodes. There is apical predominant centrilobular emphysema. Respiratory motion limits evaluation of the pulmonary parenchyma. There a few areas of pleural calcification including the hemidiaphragms and the posteromedial aspects of the parietal pleura. No pleural effusion. The liver is normal in size and shape. The gallbladder is nondistended. No biliary ductal dilation. The spleen, adrenal glands, and pancreas are unremarkable. The renal parenchyma enhances symmetrically. A simple attenuating posterior left renal cystic lesion measuring 19 mm in diameter. Few other subcentimeter low attenuating renal lesions too small to further characterize. The urinary bladder is partially decompressed limiting evaluation. Prostate and seminal vesicles are unremarkable. No retroperitoneal lymphadenopathy. Hiatal hernia. Dilated loops of fluid filled jejunum in the left abdomen, with adjacent loops of wall thickening versus decompression. Distal loops are normal in caliber. Distal ileum shows gas and fluid as can be seen with feculization of the small bowel. The appendix is normal. Stool in the colon. Sigmoid predominant diverticulosis without evidence of diverticulitis. There is a left inguinal hernia containing loops of small bowel without evidence of incarceration.   There is age indeterminate compression deformities of L3 and L2. Left femoral neck fixation hardware.         1. No acute aortic pathology. 2. Infrarenal aorto bi-iliac stent graft. No abdominal aortic aneurysm or evidence of endoleak. Stent graft components are patent. 3. Mild to moderate thoracic aortic arch and descending thoracic aortic atherosclerosis with few areas of small penetrating atherosclerotic ulcers. There is additionally left lateral saccular outpouching  at the level of the diaphragm short in length where the aorta measures 32 mm in caliber. 4. Suspect at least moderate grade stenosis of the left external iliac artery related to atherosclerosis and tortuosity just distal to the left common iliac artery bifurcation.   5. Dilated loops of jejunum in the left abdomen, adjacent to areas of small bowel wall thickening versus is decompression. Consider functional obstruction, enteritis. No mechanical bowel obstruction identified. 6. Left inguinal hernia containing loops of small bowel without evidence of incarceration. 7. Sigmoid diverticulosis. 8. Age indeterminate L2 and L4 vertebral body compression deformities.   MACRO: None   Signed by: Gordo Maldonado 6/11/2024 5:22 PM Dictation workstation:   [id number]    XR chest 1 view    Result Date: 6/11/2024  Interpreted By:  Blaise Noonan, STUDY: XR CHEST 1 VIEW;  6/11/2024 4:10 pm   INDICATION: Signs/Symptoms:Chest Pain.   COMPARISON: Radiographs of the chest dated 10/27/2007.   ACCESSION NUMBER(S): [id number]   ORDERING CLINICIAN: JOSE ALEJANDRO STEPHENS   FINDINGS: AP radiograph of the chest was provided.   Multi lead left subclavian AICD/pacemaker is present, similar in appearance to prior exam.   CARDIOMEDIASTINAL SILHOUETTE: Cardiomediastinal silhouette is normal in size and configuration.   LUNGS: No consolidation or pleural effusion is evident. Dense material in the right lung base may represent pleural calyx.   ABDOMEN: No remarkable upper abdominal findings.   BONES: No acute osseous changes.       1.  No evidence of consolidation or pleural effusion. New dense material in the right lung base may represent pleural calcifications.       MACRO: None   Signed by: Blaise Noonan 6/11/2024 4:31 PM Dictation workstation:   [id number]   Results for orders placed or performed during the hospital encounter of 06/11/24 (from the past 24 hour(s))   Comprehensive metabolic panel   Result Value Ref Range     Glucose 167 (H) 74 - 99 mg/dL    Sodium 136 136 - 145 mmol/L    Potassium 4.8 3.5 - 5.3 mmol/L    Chloride 104 98 - 107 mmol/L    Bicarbonate 25 21 - 32 mmol/L    Anion Gap 12 10 - 20 mmol/L    Urea Nitrogen 24 (H) 6 - 23 mg/dL    Creatinine 0.72 0.50 - 1.30 mg/dL    eGFR >90 >60 mL/min/1.73m*2    Calcium 9.0 8.6 - 10.3 mg/dL    Albumin 3.9 3.4 - 5.0 g/dL    Alkaline Phosphatase 52 33 - 136 U/L    Total Protein 6.1 (L) 6.4 - 8.2 g/dL    AST 10 9 - 39 U/L    Bilirubin, Total 0.4 0.0 - 1.2 mg/dL    ALT 14 10 - 52 U/L   CBC and Auto Differential   Result Value Ref Range    WBC 14.0 (H) 4.4 - 11.3 x10*3/uL    nRBC 0.0 0.0 - 0.0 /100 WBCs    RBC 4.75 4.50 - 5.90 x10*6/uL    Hemoglobin 14.2 13.5 - 17.5 g/dL    Hematocrit 44.2 41.0 - 52.0 %    MCV 93 80 - 100 fL    MCH 29.9 26.0 - 34.0 pg    MCHC 32.1 32.0 - 36.0 g/dL    RDW 14.0 11.5 - 14.5 %    Platelets 244 150 - 450 x10*3/uL    Neutrophils % 88.0 40.0 - 80.0 %    Immature Granulocytes %, Automated 0.4 0.0 - 0.9 %    Lymphocytes % 6.8 13.0 - 44.0 %    Monocytes % 4.7 2.0 - 10.0 %    Eosinophils % 0.0 0.0 - 6.0 %    Basophils % 0.1 0.0 - 2.0 %    Neutrophils Absolute 12.27 (H) 1.60 - 5.50 x10*3/uL    Immature Granulocytes Absolute, Automated 0.06 0.00 - 0.50 x10*3/uL    Lymphocytes Absolute 0.95 0.80 - 3.00 x10*3/uL    Monocytes Absolute 0.66 0.05 - 0.80 x10*3/uL    Eosinophils Absolute 0.00 0.00 - 0.40 x10*3/uL    Basophils Absolute 0.01 0.00 - 0.10 x10*3/uL   Magnesium   Result Value Ref Range    Magnesium 2.20 1.60 - 2.40 mg/dL                         [place] Coma Scale  Best Eye Response: Spontaneous  Best Verbal Response: Oriented  Best Motor Response: Follows commands  [place] Coma Scale Score: 15                             Assessment/Plan      Principal Problem:    Syncope and collapse  Active Problems:    Ectatic aorta (CMS-HCC)    Cigarette nicotine dependence with nicotine-induced disorder    Abdominal aortic aneurysm (AAA) without rupture (CMS-HCC)    Chest  pain, unspecified    Ventricular tachycardia (Multi)  -[facility] by [facility] cardiology during this hospitalization.  Antiplatelet and anticoagulant to be restarted tomorrow a.m. per their recommendations.  -Recommendations for needs during hospitalization and at discharge via PT and OT  -Continue promotion of lifestyle modifications, such as: Strict BP and glycemic control, dietary habit changes, incorporation of daily exercise regimen, adherence to all prescription/OTC medication schedules, attendance to all follow-up appointments, cessation from smoking if applicable, abstinence from alcohol and illicit drug use if applicable  -Patient to follow-up with PCP in 1 to 2 weeks postdischarge  -Patient does not require follow-up with general neurology in the outpatient setting    Total face-to-face time spent with patient today was approximately 15 minutes; more than 50% of my time was spent counseling patient on: preparation to see patient via chart review of resulted laboratory values, radiographic imaging, prescribed medications, and impairment of involved organ systems. Time also spent on medical examination, placing appropriate orders for testing/medications, communicating with other health care providers, counseling/educating the patient/family, and care coordination.    Neurology to sign off at this time. Thank you for the opportunity to be a part of this patient's multidisciplinary treatment team.  If any additional questions or concerns arise, please do not hesitate to contact me or the on-call neurologist via [facility] [facility].    *This note was created using voice recognition transcription software. Despite proofreading, unintentional typographical errors may be present. Please contact the department of neurology with any questions or concerns.       Sapphire Lopez, APRN-CNP

## 2024-06-13 NOTE — NURSING NOTE
HF Clinical Nurse Navigator Documentation     Congestive Heart Failure disease education was attempted by the Clinical Nurse Navigator. Patient is not appropriate for education at this time.  pt off floor having heart catheterization

## 2024-06-13 NOTE — DISCHARGE INSTRUCTIONS
CARDIAC CATHETERIZATION DISCHARGE INSTRUCTIONS     FOR SUDDEN AND SEVERE CHEST PAIN, SHORTNESS OF BREATH, EXCESSIVE BLEEDING, SIGNS OF STROKE, OR CHANGES IN MENTAL STATUS YOU SHOULD CALL 911 IMMEDIATELY.     If your provider has prescribed aspirin and/or clopidogrel (Plavix), or prasugrel (Effient), or ticagrelor (Brilinta), DO NOT STOP THESE MEDICATIONS for any reason without talking to your cardiologist first. If any of these were prescribed, you must take them every day without missing a single dose. If you are getting low on these medications, contact your provider immediately for a refill.     FOR NEXT 24 HOURS  - Upon discharge, you should return home and rest for the remainder of the day and evening. You do not have to stay on bed rest but should not be very active.  It is recommended a responsible adult be with you for the first 24 hours after the procedure.    - No driving for 24 hours after procedure. Please arrange for someone to drive you home from the hospital today.     - Do not drive, operate machinery, or use power tools for 24 hours after your procedure.     - Do not make any legal decisions for 24 hours after your procedure.     - Do not drink alcoholic beverages for 24 hours after your procedure.    WOUND CARE   *FOR RADIAL (WRIST) ACCESS*  ·      No lifting anything with affected arm, no bending or flexing affected wrist for 24 hours - for example, treat your wrist as if it is sprained.        ·      No lifting greater that 5 pounds with your affected arm for 5 days.  ·      Do not engage in vigorous activities (tennis, golf, bowling, weights) for at least 5 days after the procedure.  ·      Do not submerge the wrist in water for 7 days after the procedure.  ·      You should expect mild tingling in your hand and tenderness at the puncture site for up to 3 days.    - The transparent dressing should be removed from the site 24 hours after the procedure.  Wash the site gently with soap and  water. Rinse well and pat dry. Keep the area clean and dry. You may apply a Band-Aid to the site. Avoid lotions, ointments, or powders until fully healed.     - You may shower the day after your procedure.      - It is normal to notice a small bruise around the puncture site and/or a small grape sized or smaller lump. Any large bruising or large lump warrants a call to the office.     - If bleeding should occur apply pressure to the affected area for 10 minutes.  If the bleeding stops notify your physician.  If there is a large amount of bleeding or spurting of blood CALL 911 immediately.  DO NOT drive yourself to the hospital.    - You may experience some tenderness, bruising or minimal inflammation.  If you have any concerns, you may contact the Cath Lab or if any of these symptoms become excessive, contact your cardiologist or go to the emergency room.     OTHER INSTRUCTIONS  - You may take acetaminophen (Tylenol) as directed for discomfort.  If pain is not relieved with acetaminophen (Tylenol), contact your doctor.    - If you notice or experience any of the following, you should notify your doctor or seek medical attention  Chest pain or discomfort  Change in mental status or weakness in extremities.  Dizziness, light headedness, or feeling faint.  Change in the site where the procedure was performed, such as bleeding or an increased area of bruising or swelling.  Tingling, numbness, pain, or coolness in the leg/arm beyond the site where the procedure was performed.  Signs of infection (i.e. shaking chills, temperature > 100 degrees Fahrenheit, warmth, redness).  Changes in urination   Bloody or black stools  Vomiting blood  Severe nose bleeds  Any excessive bleeding    - Please follow up with cardiologist in 2-3 weeks.          HEART FAILURE DISCHARGE INSTRUCTIONS:  1. Weigh yourself daily and record on your weight log.  2. If you gain more than 2 or 3 pounds overnight, call your cardiologist.  3. Follow a low  sodium diet. No more than 2000 mg in one day, or more than 650 mg per meal.  4. Limit total fluids to no more than 8 cups (or 2 liters) per day - this includes all fluids (water, coffee, juice, milk, tea, etc.)  5. Monitor your blood pressure daily and record on your weight log.  6. Call to schedule your follow-up appointments when you get home if they were not already scheduled for you.  7. Keep your follow-up appointments! Bring your weight log with you so the doctors can see your weight trend and blood pressure readings.  8. Be sure to  any new prescriptions and take them as directed. If unsure of the medications, be sure to call your cardiologist.  9. Stay as active as you can tolerate.   10. If you notice subtle change of symptoms (slight increase in swelling, slight shortness of breath, a new intolerance to laying flat, a new cough), be sure to call your cardiologist.

## 2024-06-14 VITALS
RESPIRATION RATE: 16 BRPM | SYSTOLIC BLOOD PRESSURE: 151 MMHG | HEIGHT: 73 IN | TEMPERATURE: 96.6 F | DIASTOLIC BLOOD PRESSURE: 79 MMHG | HEART RATE: 75 BPM | OXYGEN SATURATION: 95 % | WEIGHT: 173.5 LBS | BODY MASS INDEX: 22.99 KG/M2

## 2024-06-14 PROBLEM — R07.9 CHEST PAIN, UNSPECIFIED: Status: RESOLVED | Noted: 2024-06-11 | Resolved: 2024-06-14

## 2024-06-14 PROBLEM — I47.20 VENTRICULAR TACHYCARDIA (MULTI): Status: RESOLVED | Noted: 2024-06-11 | Resolved: 2024-06-14

## 2024-06-14 LAB
ALBUMIN SERPL BCP-MCNC: 3.7 G/DL (ref 3.4–5)
ALP SERPL-CCNC: 47 U/L (ref 33–136)
ALT SERPL W P-5'-P-CCNC: 11 U/L (ref 10–52)
ANION GAP SERPL CALC-SCNC: 10 MMOL/L (ref 10–20)
AST SERPL W P-5'-P-CCNC: 9 U/L (ref 9–39)
BASOPHILS # BLD AUTO: 0.01 X10*3/UL (ref 0–0.1)
BASOPHILS NFR BLD AUTO: 0.1 %
BILIRUB SERPL-MCNC: 0.4 MG/DL (ref 0–1.2)
BUN SERPL-MCNC: 24 MG/DL (ref 6–23)
CALCIUM SERPL-MCNC: 8.6 MG/DL (ref 8.6–10.3)
CHLORIDE SERPL-SCNC: 106 MMOL/L (ref 98–107)
CO2 SERPL-SCNC: 25 MMOL/L (ref 21–32)
CREAT SERPL-MCNC: 0.75 MG/DL (ref 0.5–1.3)
EGFRCR SERPLBLD CKD-EPI 2021: >90 ML/MIN/1.73M*2
EOSINOPHIL # BLD AUTO: 0 X10*3/UL (ref 0–0.4)
EOSINOPHIL NFR BLD AUTO: 0 %
ERYTHROCYTE [DISTWIDTH] IN BLOOD BY AUTOMATED COUNT: 14.3 % (ref 11.5–14.5)
GLUCOSE SERPL-MCNC: 156 MG/DL (ref 74–99)
HCT VFR BLD AUTO: 43.7 % (ref 41–52)
HGB BLD-MCNC: 13.8 G/DL (ref 13.5–17.5)
IMM GRANULOCYTES # BLD AUTO: 0.05 X10*3/UL (ref 0–0.5)
IMM GRANULOCYTES NFR BLD AUTO: 0.4 % (ref 0–0.9)
LYMPHOCYTES # BLD AUTO: 0.89 X10*3/UL (ref 0.8–3)
LYMPHOCYTES NFR BLD AUTO: 7.8 %
MAGNESIUM SERPL-MCNC: 2.11 MG/DL (ref 1.6–2.4)
MCH RBC QN AUTO: 29.4 PG (ref 26–34)
MCHC RBC AUTO-ENTMCNC: 31.6 G/DL (ref 32–36)
MCV RBC AUTO: 93 FL (ref 80–100)
MONOCYTES # BLD AUTO: 0.39 X10*3/UL (ref 0.05–0.8)
MONOCYTES NFR BLD AUTO: 3.4 %
NEUTROPHILS # BLD AUTO: 10 X10*3/UL (ref 1.6–5.5)
NEUTROPHILS NFR BLD AUTO: 88.3 %
NRBC BLD-RTO: 0 /100 WBCS (ref 0–0)
PLATELET # BLD AUTO: 273 X10*3/UL (ref 150–450)
POTASSIUM SERPL-SCNC: 4.5 MMOL/L (ref 3.5–5.3)
PROT SERPL-MCNC: 5.8 G/DL (ref 6.4–8.2)
RBC # BLD AUTO: 4.7 X10*6/UL (ref 4.5–5.9)
SODIUM SERPL-SCNC: 136 MMOL/L (ref 136–145)
WBC # BLD AUTO: 11.3 X10*3/UL (ref 4.4–11.3)

## 2024-06-14 PROCEDURE — 2500000002 HC RX 250 W HCPCS SELF ADMINISTERED DRUGS (ALT 637 FOR MEDICARE OP, ALT 636 FOR OP/ED): Performed by: INTERNAL MEDICINE

## 2024-06-14 PROCEDURE — 94640 AIRWAY INHALATION TREATMENT: CPT | Mod: MUE

## 2024-06-14 PROCEDURE — 99239 HOSP IP/OBS DSCHRG MGMT >30: CPT | Performed by: INTERNAL MEDICINE

## 2024-06-14 PROCEDURE — 83735 ASSAY OF MAGNESIUM: CPT | Performed by: INTERNAL MEDICINE

## 2024-06-14 PROCEDURE — 84075 ASSAY ALKALINE PHOSPHATASE: CPT | Performed by: INTERNAL MEDICINE

## 2024-06-14 PROCEDURE — 2500000004 HC RX 250 GENERAL PHARMACY W/ HCPCS (ALT 636 FOR OP/ED): Performed by: NURSE PRACTITIONER

## 2024-06-14 PROCEDURE — 85025 COMPLETE CBC W/AUTO DIFF WBC: CPT | Performed by: INTERNAL MEDICINE

## 2024-06-14 PROCEDURE — 2500000001 HC RX 250 WO HCPCS SELF ADMINISTERED DRUGS (ALT 637 FOR MEDICARE OP): Performed by: INTERNAL MEDICINE

## 2024-06-14 PROCEDURE — 36415 COLL VENOUS BLD VENIPUNCTURE: CPT | Performed by: INTERNAL MEDICINE

## 2024-06-14 PROCEDURE — 94640 AIRWAY INHALATION TREATMENT: CPT

## 2024-06-14 PROCEDURE — 2500000001 HC RX 250 WO HCPCS SELF ADMINISTERED DRUGS (ALT 637 FOR MEDICARE OP): Performed by: NURSE PRACTITIONER

## 2024-06-14 PROCEDURE — 2500000004 HC RX 250 GENERAL PHARMACY W/ HCPCS (ALT 636 FOR OP/ED)

## 2024-06-14 RX ORDER — ALBUTEROL SULFATE 90 UG/1
1-2 AEROSOL, METERED RESPIRATORY (INHALATION) EVERY 6 HOURS PRN
Qty: 18 G | Refills: 2 | Status: SHIPPED | OUTPATIENT
Start: 2024-06-14 | End: 2024-08-13

## 2024-06-14 RX ADMIN — ALBUTEROL SULFATE 2.5 MG: 2.5 SOLUTION RESPIRATORY (INHALATION) at 07:07

## 2024-06-14 RX ADMIN — ASPIRIN 325 MG ORAL TABLET 325 MG: 325 PILL ORAL at 08:49

## 2024-06-14 RX ADMIN — CLOPIDOGREL 75 MG: 75 TABLET ORAL at 08:50

## 2024-06-14 RX ADMIN — SACUBITRIL AND VALSARTAN 1 TABLET: 24; 26 TABLET, FILM COATED ORAL at 08:51

## 2024-06-14 RX ADMIN — ALBUTEROL SULFATE 2.5 MG: 2.5 SOLUTION RESPIRATORY (INHALATION) at 13:35

## 2024-06-14 RX ADMIN — ATORVASTATIN CALCIUM 40 MG: 40 TABLET, FILM COATED ORAL at 08:50

## 2024-06-14 RX ADMIN — EMPAGLIFLOZIN 12.5 MG: 10 TABLET, FILM COATED ORAL at 08:52

## 2024-06-14 RX ADMIN — METHYLPREDNISOLONE SODIUM SUCCINATE 40 MG: 40 INJECTION, POWDER, FOR SOLUTION INTRAMUSCULAR; INTRAVENOUS at 12:15

## 2024-06-14 RX ADMIN — DOXYCYCLINE 100 MG: 100 INJECTION, POWDER, LYOPHILIZED, FOR SOLUTION INTRAVENOUS at 11:54

## 2024-06-14 RX ADMIN — CYANOCOBALAMIN TAB 1000 MCG 1000 MCG: 1000 TAB at 08:50

## 2024-06-14 RX ADMIN — CARVEDILOL 6.25 MG: 3.12 TABLET, FILM COATED ORAL at 08:49

## 2024-06-14 RX ADMIN — Medication 1000 UNITS: at 08:50

## 2024-06-14 ASSESSMENT — PAIN SCALES - GENERAL
PAINLEVEL_OUTOF10: 0 - NO PAIN
PAINLEVEL_OUTOF10: 0 - NO PAIN

## 2024-06-14 NOTE — CARE PLAN
Problem: Respiratory  Goal: Clear secretions with interventions this shift  Outcome: Progressing  Goal: Minimize anxiety/maximize coping throughout shift  Outcome: Progressing     Problem: Fall/Injury  Goal: Not fall by end of shift  Outcome: Progressing  Goal: Be free from injury by end of the shift  Outcome: Progressing  Goal: Verbalize understanding of personal risk factors for fall in the hospital  Outcome: Progressing   The patient's goals for the shift include      The clinical goals for the shift include remain hemodynamically stable    Over the shift, the patient did not make progress toward the following goals. Barriers to progression include . Recommendations to address these barriers include .

## 2024-06-14 NOTE — NURSING NOTE
CHF Clinical Nurse Navigator Documentation    Congestive Heart Failure disease education was performed by the Clinical Nurse Navigator with a good understanding. Yes    CHF signs and symptoms discussed and when to call cardiologist?  Yes  Living With Heart Failure Education booklet?  Yes  Controlling Heart Failure at Home Education? Yes  CHF Education Teaching Tool? Yes  Home medication usage?  Yes  Nutrition Education? Yes, Low Sodium Diet  Fluid Restriction Education? Yes  Daily Weight Education? Yes  Cardiovascular Rehab Referral ordered?  No  Follow-up with Cardiologist after discharge education? Yes    Comments: [facility]. Patient states he has had CHF for 15 years and is aware of the signs and symptoms to monitor at home. Reinforced education and when to call cardiologist. Discussed the importance of limiting sodium intake to prevent fluid retention. Discussed foods that are better to choose from and foods that are better to avoid. Discussed Healthy at Home program and the benefits of 24/7 nursing support and referral sent. Patient appreciated education and verbalized understanding of teaching provided.

## 2024-06-14 NOTE — PROGRESS NOTES
Call[facility] and [facility],  she asking that we fax the DC summary when available to [phone].  [facility] to [facility] in [place] as pt is going home to his sister's home in [facility].  Jossie Wang RN TCC    [id number]  [facility] the [facility] per [facility]. Pt discharging home to sister's today.  Jossie Wang RN TCC

## 2024-06-14 NOTE — PROGRESS NOTES
Reason For Consult  COPD exacerbation    History Of Present Illness  Osmany Michele is a 73 y.o. male with a past medical history of CAD s/p multiple stents, SSS s/p PPM, s/p aortic repair/graft, nicotine use disorder, COPD, initially presented hospital chest pain and syncope.  Patient states that he believes he was COPD causing his symptoms and not chest pain.  He is at increased utilization of his rescue inhaler or nebulizers at home over the past several days.  He also has a cough that is nonproductive along orthopnea and wheezing.  2 nights ago he did have a syncopal episode.  CXR findings shows no evidence of consolidation or pleural effusion.  There is a new dense material in the right lung base that may represent pleural calcifications.  CT angio chest still shows no PE.  There are some calcifications located in the right lung base however.  He has also had increased smoking cigarettes over the last couple weeks.  He was previously on 1 cigarette a week however is now smoking half a pack a day.  He denies any fever or chills.     Past Medical History  He has no past medical history on file.    Surgical History  He has a past surgical history that includes Cardiac catheterization (N/A, 6/13/2024) and Cardiac catheterization (N/A, 6/13/2024).     Social History  He reports that he has been smoking cigarettes. He has never used smokeless tobacco. He reports that he does not drink alcohol and does not use drugs.    Family History  No family history on file.     Allergies  Azithromycin, Effexor [venlafaxine], and Baclofen    Review of Systems  A 12 point review of systems was performed and otherwise negative except as stated in the HPI.      Physical Exam  General:  Pleasant and cooperative. No apparent distress.  HEENT:  Normocephalic, atraumatic, mucus membranes moist.   Neck:  Trachea midline.  No JVD.    Chest: Mild bilateral wheezing present.  No crackles.  CV:  Regular rate and rhythm.  Positive S1/S2.    Abdomen: Bowel sounds present in all four quadrants, abdomen is soft, non-tender, non-distended.  Extremities:  No lower extremity edema or cyanosis.   Neurological:  AAOx3. No focal deficits.  Skin:  Warm and dry.        Last Recorded Vitals  /79 (BP Location: Left arm, Patient Position: Sitting)   Pulse 75   Temp 35.9 °C (96.6 °F) (Temporal)   Resp 16   Wt 78.7 kg (173 lb 8 oz)   SpO2 95%     Relevant Results  All labs and imaging reviewed by myself.     Results from last 7 days   Lab Units 06/14/24  0459 06/13/24  0450 06/12/24  0452   SODIUM mmol/L 136 136 139   POTASSIUM mmol/L 4.5 4.8 4.6   CHLORIDE mmol/L 106 104 106   CO2 mmol/L 25 25 29   BUN mg/dL 24* 24* 20   CREATININE mg/dL 0.75 0.72 0.72   GLUCOSE mg/dL 156* 167* 195*   CALCIUM mg/dL 8.6 9.0 8.4*     Results from last 7 days   Lab Units 06/14/24  0459 06/13/24  0450 06/12/24  0452   WBC AUTO x10*3/uL 11.3 14.0* 5.7   HEMOGLOBIN g/dL 13.8 14.2 13.9   HEMATOCRIT % 43.7 44.2 44.9   PLATELETS AUTO x10*3/uL 273 244 231       Assessment/Plan   Osmany Kaufman is a 73-year-old male with significant past med history of COPD presenting to hospital with several day history of shortness of breath and nonproductive cough.  Send chest x-ray shows no consolidation or pleural effusion however there is new right lung base pleural calcification.  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  COPD exacerbation  - This is a patient with significant history of COPD and is a chronic smoker presenting with a COPD       Basel MD Roberto  Pulmonary critical care consultant  06/14/24  3:10 PM

## 2024-06-14 NOTE — DISCHARGE SUMMARY
Discharge Diagnosis  Syncope and collapse    Issues Requiring Follow-Up  Follow up with PCP and cardiology as scheduled.    Discharge Meds     Your medication list        CHANGE how you take these medications        Instructions Last Dose Given Next Dose Due   albuterol 90 mcg/actuation inhaler  What changed:   reasons to take this  Another medication with the same name was removed. Continue taking this medication, and follow the directions you see here.      Inhale 1-2 puffs every 6 hours if needed for wheezing or shortness of breath.              CONTINUE taking these medications        Instructions Last Dose Given Next Dose Due   atorvastatin 40 mg tablet  Commonly known as: Lipitor           carvedilol 6.25 mg tablet  Commonly known as: Coreg           cholecalciferol 25 MCG (1000 UT) capsule  Commonly known as: Vitamin D-3           clopidogrel 75 mg tablet  Commonly known as: Plavix           cyanocobalamin 1,000 mcg tablet  Commonly known as: Vitamin B-12           Eliquis 5 mg tablet  Generic drug: apixaban           Jardiance 25 mg  Generic drug: empagliflozin           sacubitriL-valsartan 24-26 mg tablet  Commonly known as: Entresto                  STOP taking these medications      spironolactone 25 mg tablet  Commonly known as: Aldactone                  Where to Get Your Medications        These medications were sent to [facility] [address] [place] [address]  [address] [place], [place] [address]      Phone: [phone]   albuterol 90 mcg/actuation inhaler         Test Results Pending At Discharge  Pending Labs       No current pending labs.            Hospital Course   Osmany Michele is a 73-year-old male with past medical history of CAD s/p multiple stents, SSS s/p PPM, s/p aortic repair/graft, nicotine use disorder, and COPD.  [facility] to [facility] and [facility]ope. Patient seen and evaluated by cardiology, and neurology,  and vascular surgery.  [place] that syncope was cardiac etiology.  Cath completed without new obstructive CAD and no new intervention.  EP evaluation completed given noted VT, and device interrogated.  Patient cleared for discharge home and follow up [facility].  He was on GDMT, but his aldactone was held, and consider resuming as outpatient if BP allows.      Pertinent Physical Exam At Time of Discharge  Physical Exam  Vitals reviewed.   Constitutional:       Appearance: Normal appearance.   HENT:      Head: Normocephalic and atraumatic.      Mouth/Throat:      Mouth: Mucous membranes are moist.   Eyes:      Conjunctiva/sclera: Conjunctivae normal.   Cardiovascular:      Rate and Rhythm: Normal rate.      Pulses: Normal pulses.   Pulmonary:      Effort: Pulmonary effort is normal.      Breath sounds: Normal breath sounds. No wheezing.   Abdominal:      General: Abdomen is flat. There is no distension.      Palpations: Abdomen is soft.      Tenderness: There is no guarding.   Musculoskeletal:         General: No swelling. Normal range of motion.   Skin:     General: Skin is warm and dry.   Neurological:      General: No focal deficit present.      Mental Status: He is alert. Mental status is at baseline.   Psychiatric:         Mood and Affect: Mood normal.         Behavior: Behavior normal.         Outpatient Follow-Up  No future appointments.      Jerad Eubanks MD

## 2024-06-14 NOTE — PROGRESS NOTES
Osmany Michele is a 73 y.o. male on day 2 of admission presenting with Syncope and collapse.      Subjective   Pt with PMHx including HTN, HLD, CAD (Multiple Stents), SSS with PPM, VHD - Aortic Repair, and COPD presented to [facility] with CP and Syncope prior to CP.       Objective     Last Recorded Vitals  VSS. AF  Intake/Output last 3 Shifts:        Admission Weight  Weight: 78 kg (172 lb) (06/11/24 1535)    Daily Weight  06/13/24 : 78.5 kg (173 lb 1 oz)    Image Results      Physical Exam  Gen - NAD  ENT - NC  Neck - No JVD  H - S1S2  L - Decreased BS  Abd - Soft, ND  Ext - x 4, W & D  Neuro - Awake and Responsive  Relevant Results    Scheduled medications  albuterol, 2.5 mg, nebulization, TID  [Held by provider] apixaban, 5 mg, oral, BID  aspirin, 325 mg, oral, Daily  atorvastatin, 40 mg, oral, Daily  carvedilol, 6.25 mg, oral, BID  cholecalciferol, 1,000 Units, oral, Daily  clopidogrel, 75 mg, oral, Daily  cyanocobalamin, 1,000 mcg, oral, Daily  doxycycline, 100 mg, intravenous, q12h  empagliflozin, 12.5 mg, oral, Daily  methylPREDNISolone sodium succinate (PF), 40 mg, intravenous, q24h  perflutren lipid microspheres, 0.5-10 mL of dilution, intravenous, Once in imaging  perflutren protein A microsphere, 0.5 mL, intravenous, Once in imaging  sacubitriL-valsartan, 1 tablet, oral, BID  sulfur hexafluoride microsphr, 2 mL, intravenous, Once in imaging      Continuous medications  sodium chloride 0.9%, 1 mL/kg/hr, Last Rate: 1 mL/kg/hr (06/13/24 1545)      PRN medications  PRN medications: acetaminophen **OR** acetaminophen **OR** acetaminophen, benzonatate, ipratropium-albuteroL, nicotine, oxygen, oxygen             Assessment/Plan   This patient currently has cardiac telemetry ordered; if you would like to modify or discontinue the telemetry order, click here to go to the orders activity to modify/discontinue the order.  HTN  HLD  CAD (Multiple Stents)  SSS with PPM  VHD  COPD  CP and  Syncope  Cardiology Appreciated  For Cardiac Cath  Continue Tx and Monitoring            Principal Problem:    Syncope and collapse  Active Problems:    Ectatic aorta (CMS-HCC)    Cigarette nicotine dependence with nicotine-induced disorder    Abdominal aortic aneurysm (AAA) without rupture (CMS-HCC)    Chest pain, unspecified    Ventricular tachycardia (Multi)                  Fernando Isaac DO

## 2024-06-14 NOTE — PROGRESS NOTES
Emily Michele is a 73 y.o. male on day 2 of admission presenting with Syncope and collapse.      Subjective   No new C/O. No CP.       Objective     Last Recorded Vitals  /69 (BP Location: Left arm, Patient Position: Lying)   Pulse 61   Temp 36.2 °C (97.2 °F) (Temporal)   Resp 18   Wt 78.5 kg (173 lb 1 oz)   SpO2 93%   Intake/Output last 3 Shifts:    Intake/Output Summary (Last 24 hours) at 6/14/2024 0046  Last data filed at 6/13/2024 1929  Gross per 24 hour   Intake 327.31 ml   Output 630 ml   Net -302.69 ml       Admission Weight  Weight: 78 kg (172 lb) (06/11/24 1535)    Daily Weight  06/13/24 : 78.5 kg (173 lb 1 oz)    Image Results  Cardiac Catheterization Procedure     [facility], Cath Lab, [address] [place] [address]    Cardiovascular Catheterization Report    Patient Name:      EMILY MICHELE      Performing Physician:  Angelic Moe MD  Study Date:        6/13/2024             Verifying Physician:   Angelic Moe MD  MRN/PID:           [id number]              Cardiologist/Co-Scrub:  Accession#:        [id number]          Ordering Provider:     Angelic MOE  Date of Birth/Age: 1950 / 73 years Cardiologist:  Gender:            M                     Fellow:  Encounter#:        [id number]            Surgeon:       Study: Left Heart Cath       Indications:  EMILY MICHELE is a 73 year old male who presents with dyslipidemia, prior myocardial infarction, prior percutaneous coronary intervention, prior aorta intervention and a chest pain assessment of atypical angina. Syncope and cardiac arrhythmia.  Medical History:  Stress test performed: No. CTA performed: No. [place] accessed: No. LVEF  Assessed: Yes.  Cardiac arrest: No.  Cardiac surgical  consult: No.  Cardiovascular instability: No.  Frailty status of patient entering lab: 3 = Managing well.       Procedure Description Comments:  Informed consent was obtained. Patient was brought to the Cath Lab and prepped and draped in usual fashion. Under moderate sedation and after giving 2% local lidocaine, right radial access was obtained and a 6 Beninese sheath was placed in. Radial cocktail was given. Using a [place] catheter coronary angiography and left heart catheterization were performed.       Coronary Angiography:  The coronary circulation is right dominant.     Coronary Angiography Comments:  Short left main without significant stenosis    Luminal irregularities throughout LAD with mid LAD after the first septal branch focal 40% stenosis    Proximal left circumflex 100% occlusion with collaterals and reconstitution of OM from left to left    Patent stents from proximal to distal RCA with diffuse 30 to 40% ISR    Right dominant coronary artery system    LVEDP 31 mmHg without significant gradient across aortic valve.       Coronary Interventions:  Angiography reveals a 100% stenosis of the proximal circumflex coronary artery. Pre-intervention NOA flow was 0. Percutaneous coronary intervention was performed within the proximal circumflex. The stenosis was successfully reduced from 100% to 100%. Post-intervention NOA flow was 0. From the angiogram it was not clear if the proximal left circumflex lesion was a subacute lesion or a . Therefore, I decided to try angioplasty on it considering the patient recent ventricular tachycardia.  He is already on aspirin and Plavix.  Extra heparin was given.  A 6 Beninese EBU 3.5 guide catheter was used to engage the left coronary artery.  Using 2 run-through wires I tried to cross the lesion but did not go. Therefore, lesion was never crossed and angioplasty was not performed.  This suggests highly that the lesion in proximal left circumflex is .  Patient has good  collaterals from left to left and reconstitution of OM.  Therefore, I believe it is best to manage this  medically.    A TR band was used to obtain hemostasis in the right wrist.    Patient tolerated procedure well and transferred to recovery area for further care monitoring.       Hemo Personnel:  +-----------------------+---------+  Name                   Duty       +-----------------------+---------+  [facility]  +-----------------------+---------+       Hemodynamic Pressures:     +----+---------------+----------+-------------+--------------+-------+---------+  Site   Date Time   Phase NameSystolic mmHgDiastolic mmHgED mmHgMean mmHg  +----+---------------+----------+-------------+--------------+-------+---------+          6/13/2024   O2 REST           75            21     24                   10:27:51 AM                                                       +----+---------------+----------+-------------+--------------+-------+---------+    LV      6/13/2024   O2 REST          101            13     27                   10:27:58 AM                                                       +----+---------------+----------+-------------+--------------+-------+---------+   LVp      6/13/2024   O2 REST           90            17     31                   10:28:02 AM                                                       +----+---------------+----------+-------------+--------------+-------+---------+   AOp      6/13/2024   O2 REST           99            66              80          10:28:07 AM                                                       +----+---------------+----------+-------------+--------------+-------+---------+    AO      6/13/2024   O2 REST          113            66              78          10:28:18 AM                                                        +----+---------------+----------+-------------+--------------+-------+---------+       Complications:  No in-lab complications observed.     Cardiac Cath Post Procedure Notes:  Post Procedure Diagnosis: Coronary artery disease as above.  Blood Loss:               Estimated blood loss during the procedure was 5 mls.  Specimens Removed:        Number of specimen(s) removed: none.       Recommendations:  Maximize medical therapy.  Agressive risk factor modification efforts.  Follow-up with cardiology clinic.  Lipid lowering agent or Statin therapy.  Medical management of coronary artery disease.  EP consult.  Aspirin therapy.  Beta blocker therapy.    ____________________________________________________________________________________  CONCLUSIONS:   1. Short left main without significant stenosis            Luminal irregularities throughout LAD with mid LAD after the first septal branch focal 40% stenosis            Proximal left circumflex 100% occlusion with collaterals and reconstitution of OM from left to left            Patent stents from proximal to distal RCA with diffuse 30 to 40% ISR            Right dominant coronary artery system            LVEDP 31 mmHg without significant gradient across aortic valve.    ICD 10 Codes:  Ventricular tachycardia, other-I47.29     CPT Codes:  Left Heart Cath (visualization of coronaries) and LV-63217; Angioplasty, single Left Circumflex major Artery/branch (PCI)-76567.LC; Reduced Services, portion of procedure performed-MOD52; Moderate Sedation Services 1st additional 15 minutes patient >5 years-72053; Moderate Sedation Services 2nd additional 15 minutes patient >5 years-94907; Moderate Sedation Services 3rd additional 15 minutes patient >5 years-26523     [id number] Jeronimo Moe MD  Performing Physician  Electronically signed by [id number] Jeronimo Moe MD on 6/13/2024 at 11:24:22  AM         ** Final **      Physical Exam  Gen - NAD  ENT - NC  Neck - No JVD  H -  S1S2  L - Decreased BS  Abd - Soft, ND  Ext - x 4, W & D  Neuro - Awake and Responsive  Relevant Results    Scheduled medications  albuterol, 2.5 mg, nebulization, TID  [Held by provider] apixaban, 5 mg, oral, BID  aspirin, 325 mg, oral, Daily  atorvastatin, 40 mg, oral, Daily  carvedilol, 6.25 mg, oral, BID  cholecalciferol, 1,000 Units, oral, Daily  clopidogrel, 75 mg, oral, Daily  cyanocobalamin, 1,000 mcg, oral, Daily  doxycycline, 100 mg, intravenous, q12h  empagliflozin, 12.5 mg, oral, Daily  methylPREDNISolone sodium succinate (PF), 40 mg, intravenous, q24h  perflutren lipid microspheres, 0.5-10 mL of dilution, intravenous, Once in imaging  perflutren protein A microsphere, 0.5 mL, intravenous, Once in imaging  sacubitriL-valsartan, 1 tablet, oral, BID  sulfur hexafluoride microsphr, 2 mL, intravenous, Once in imaging      Continuous medications  sodium chloride 0.9%, 1 mL/kg/hr, Last Rate: 1 mL/kg/hr (06/13/24 4185)      PRN medications  PRN medications: acetaminophen **OR** acetaminophen **OR** acetaminophen, benzonatate, ipratropium-albuteroL, nicotine, oxygen, oxygen  Results for orders placed or performed during the hospital encounter of 06/11/24 (from the past 24 hour(s))   Comprehensive metabolic panel   Result Value Ref Range    Glucose 167 (H) 74 - 99 mg/dL    Sodium 136 136 - 145 mmol/L    Potassium 4.8 3.5 - 5.3 mmol/L    Chloride 104 98 - 107 mmol/L    Bicarbonate 25 21 - 32 mmol/L    Anion Gap 12 10 - 20 mmol/L    Urea Nitrogen 24 (H) 6 - 23 mg/dL    Creatinine 0.72 0.50 - 1.30 mg/dL    eGFR >90 >60 mL/min/1.73m*2    Calcium 9.0 8.6 - 10.3 mg/dL    Albumin 3.9 3.4 - 5.0 g/dL    Alkaline Phosphatase 52 33 - 136 U/L    Total Protein 6.1 (L) 6.4 - 8.2 g/dL    AST 10 9 - 39 U/L    Bilirubin, Total 0.4 0.0 - 1.2 mg/dL    ALT 14 10 - 52 U/L   CBC and Auto Differential   Result Value Ref Range    WBC 14.0 (H) 4.4 - 11.3 x10*3/uL    nRBC 0.0 0.0 - 0.0 /100 WBCs    RBC 4.75 4.50 - 5.90 x10*6/uL     Hemoglobin 14.2 13.5 - 17.5 g/dL    Hematocrit 44.2 41.0 - 52.0 %    MCV 93 80 - 100 fL    MCH 29.9 26.0 - 34.0 pg    MCHC 32.1 32.0 - 36.0 g/dL    RDW 14.0 11.5 - 14.5 %    Platelets 244 150 - 450 x10*3/uL    Neutrophils % 88.0 40.0 - 80.0 %    Immature Granulocytes %, Automated 0.4 0.0 - 0.9 %    Lymphocytes % 6.8 13.0 - 44.0 %    Monocytes % 4.7 2.0 - 10.0 %    Eosinophils % 0.0 0.0 - 6.0 %    Basophils % 0.1 0.0 - 2.0 %    Neutrophils Absolute 12.27 (H) 1.60 - 5.50 x10*3/uL    Immature Granulocytes Absolute, Automated 0.06 0.00 - 0.50 x10*3/uL    Lymphocytes Absolute 0.95 0.80 - 3.00 x10*3/uL    Monocytes Absolute 0.66 0.05 - 0.80 x10*3/uL    Eosinophils Absolute 0.00 0.00 - 0.40 x10*3/uL    Basophils Absolute 0.01 0.00 - 0.10 x10*3/uL   Magnesium   Result Value Ref Range    Magnesium 2.20 1.60 - 2.40 mg/dL                Assessment/Plan   This patient currently has cardiac telemetry ordered; if you would like to modify or discontinue the telemetry order, click here to go to the orders activity to modify/discontinue the order.  HTN  HLD  CAD - S/P Cardiac Cath - Patent Stents  SSS with PPM - Recent V Tach  VHD  COPD - Pulmonary Appreciated  Thoracic Ectasia and Abnormal CT of Infrarenals/Iliac Arteries - Vascular Surgery Appreciated  CP and Syncope  Continue Tx  Cardiology Appreciated  Await EP Evaluation  ? Discharge Soon ?                Principal Problem:    Syncope and collapse  Active Problems:    Ectatic aorta (CMS-HCC)    Cigarette nicotine dependence with nicotine-induced disorder    Abdominal aortic aneurysm (AAA) without rupture (CMS-HCC)    Chest pain, unspecified    Ventricular tachycardia (Multi)                  Fernando Isaac,

## 2024-06-20 ENCOUNTER — APPOINTMENT (OUTPATIENT)
Dept: GENERAL RADIOLOGY | Age: 74
End: 2024-06-20
Payer: MEDICARE

## 2024-06-20 ENCOUNTER — HOSPITAL ENCOUNTER (OUTPATIENT)
Age: 74
Setting detail: OBSERVATION
Discharge: HOME | End: 2024-06-21
Attending: EMERGENCY MEDICINE | Admitting: HOSPITALIST
Payer: MEDICARE

## 2024-06-20 DIAGNOSIS — R07.9 CHEST PAIN, UNSPECIFIED TYPE: Primary | ICD-10-CM

## 2024-06-20 DIAGNOSIS — R06.01 ORTHOPNEA: ICD-10-CM

## 2024-06-20 PROBLEM — Z72.0 TOBACCO ABUSE: Status: ACTIVE | Noted: 2024-06-20

## 2024-06-20 PROBLEM — J44.9 COPD (CHRONIC OBSTRUCTIVE PULMONARY DISEASE) (HCC): Status: ACTIVE | Noted: 2024-06-20

## 2024-06-20 PROBLEM — I25.119 CORONARY ARTERY DISEASE INVOLVING NATIVE CORONARY ARTERY OF NATIVE HEART WITH ANGINA PECTORIS (HCC): Status: ACTIVE | Noted: 2024-06-20

## 2024-06-20 PROBLEM — E78.49 OTHER HYPERLIPIDEMIA: Status: ACTIVE | Noted: 2024-06-20

## 2024-06-20 PROBLEM — I50.20 HEART FAILURE WITH REDUCED EJECTION FRACTION (HCC): Status: ACTIVE | Noted: 2024-06-20

## 2024-06-20 PROBLEM — I47.20 V-TACH (HCC): Status: ACTIVE | Noted: 2024-06-20

## 2024-06-20 LAB
ALBUMIN SERPL-MCNC: 4 G/DL (ref 3.5–5.2)
ALBUMIN/GLOB SERPL: 2 {RATIO} (ref 1–2.5)
ALP SERPL-CCNC: 70 U/L (ref 40–129)
ALT SERPL-CCNC: 10 U/L (ref 10–50)
ANION GAP SERPL CALCULATED.3IONS-SCNC: 9 MMOL/L (ref 9–16)
AST SERPL-CCNC: 22 U/L (ref 10–50)
ATRIAL RATE: 60 BPM
BASOPHILS # BLD: <0.03 K/UL (ref 0–0.2)
BASOPHILS NFR BLD: 0 % (ref 0–2)
BILIRUB SERPL-MCNC: 0.5 MG/DL (ref 0–1.2)
BNP SERPL-MCNC: 1419 PG/ML (ref 0–300)
BUN SERPL-MCNC: 17 MG/DL (ref 8–23)
CALCIUM SERPL-MCNC: 8.3 MG/DL (ref 8.6–10.4)
CHLORIDE SERPL-SCNC: 98 MMOL/L (ref 98–107)
CO2 SERPL-SCNC: 32 MMOL/L (ref 20–31)
CREAT SERPL-MCNC: 1 MG/DL (ref 0.7–1.2)
EOSINOPHIL # BLD: 0.12 K/UL (ref 0–0.44)
EOSINOPHILS RELATIVE PERCENT: 2 % (ref 1–4)
ERYTHROCYTE [DISTWIDTH] IN BLOOD BY AUTOMATED COUNT: 14.1 % (ref 11.8–14.4)
GFR, ESTIMATED: 77 ML/MIN/1.73M2
GLUCOSE SERPL-MCNC: 167 MG/DL (ref 74–99)
HCT VFR BLD AUTO: 45.6 % (ref 40.7–50.3)
HGB BLD-MCNC: 14.2 G/DL (ref 13–17)
IMM GRANULOCYTES # BLD AUTO: 0.05 K/UL (ref 0–0.3)
IMM GRANULOCYTES NFR BLD: 1 %
LYMPHOCYTES NFR BLD: 1.16 K/UL (ref 1.1–3.7)
LYMPHOCYTES RELATIVE PERCENT: 16 % (ref 24–43)
MAGNESIUM SERPL-MCNC: 2.2 MG/DL (ref 1.6–2.4)
MCH RBC QN AUTO: 29 PG (ref 25.2–33.5)
MCHC RBC AUTO-ENTMCNC: 31.1 G/DL (ref 28.4–34.8)
MCV RBC AUTO: 93.1 FL (ref 82.6–102.9)
MONOCYTES NFR BLD: 0.84 K/UL (ref 0.1–1.2)
MONOCYTES NFR BLD: 12 % (ref 3–12)
NEUTROPHILS NFR BLD: 69 % (ref 36–65)
NEUTS SEG NFR BLD: 4.91 K/UL (ref 1.5–8.1)
NRBC BLD-RTO: 0 PER 100 WBC
P AXIS: 63 DEGREES
PLATELET # BLD AUTO: 226 K/UL (ref 138–453)
PMV BLD AUTO: 9.5 FL (ref 8.1–13.5)
POTASSIUM SERPL-SCNC: 3.7 MMOL/L (ref 3.7–5.3)
PR INTERVAL: 209 MS
PROT SERPL-MCNC: 6.4 G/DL (ref 6.6–8.7)
Q ONSET: 251 MS
QRS COUNT: 10 BEATS
QRS DURATION: 147 MS
QT INTERVAL: 468 MS
QTC CALCULATION(BAZETT): 468 MS
QTC FREDERICIA: 468 MS
R AXIS: 96 DEGREES
RBC # BLD AUTO: 4.9 M/UL (ref 4.21–5.77)
SODIUM SERPL-SCNC: 139 MMOL/L (ref 136–145)
T AXIS: -74 DEGREES
T OFFSET: 485 MS
T4 FREE SERPL-MCNC: 0.7 NG/DL (ref 0.92–1.68)
TROPONIN I SERPL HS-MCNC: 15 NG/L (ref 0–22)
TROPONIN I SERPL HS-MCNC: 19 NG/L (ref 0–22)
TSH SERPL DL<=0.05 MIU/L-ACNC: 13.6 UIU/ML (ref 0.27–4.2)
VENTRICULAR RATE: 60 BPM
WBC OTHER # BLD: 7.1 K/UL (ref 3.5–11.3)

## 2024-06-20 PROCEDURE — 71046 X-RAY EXAM CHEST 2 VIEWS: CPT

## 2024-06-20 PROCEDURE — 84443 ASSAY THYROID STIM HORMONE: CPT

## 2024-06-20 PROCEDURE — G0378 HOSPITAL OBSERVATION PER HR: HCPCS

## 2024-06-20 PROCEDURE — 84439 ASSAY OF FREE THYROXINE: CPT

## 2024-06-20 PROCEDURE — 6370000000 HC RX 637 (ALT 250 FOR IP): Performed by: NURSE PRACTITIONER

## 2024-06-20 PROCEDURE — 93005 ELECTROCARDIOGRAM TRACING: CPT | Performed by: NURSE PRACTITIONER

## 2024-06-20 PROCEDURE — 6370000000 HC RX 637 (ALT 250 FOR IP)

## 2024-06-20 PROCEDURE — 6360000002 HC RX W HCPCS: Performed by: NURSE PRACTITIONER

## 2024-06-20 PROCEDURE — 80053 COMPREHEN METABOLIC PANEL: CPT

## 2024-06-20 PROCEDURE — 83735 ASSAY OF MAGNESIUM: CPT

## 2024-06-20 PROCEDURE — 96374 THER/PROPH/DIAG INJ IV PUSH: CPT

## 2024-06-20 PROCEDURE — 2580000003 HC RX 258: Performed by: NURSE PRACTITIONER

## 2024-06-20 PROCEDURE — 83880 ASSAY OF NATRIURETIC PEPTIDE: CPT

## 2024-06-20 PROCEDURE — 85025 COMPLETE CBC W/AUTO DIFF WBC: CPT

## 2024-06-20 PROCEDURE — 99285 EMERGENCY DEPT VISIT HI MDM: CPT

## 2024-06-20 PROCEDURE — 84484 ASSAY OF TROPONIN QUANT: CPT

## 2024-06-20 RX ORDER — MAGNESIUM SULFATE 1 G/100ML
1000 INJECTION INTRAVENOUS PRN
Status: DISCONTINUED | OUTPATIENT
Start: 2024-06-20 | End: 2024-06-21 | Stop reason: HOSPADM

## 2024-06-20 RX ORDER — CARVEDILOL 6.25 MG/1
6.25 TABLET ORAL 2 TIMES DAILY WITH MEALS
Status: DISCONTINUED | OUTPATIENT
Start: 2024-06-20 | End: 2024-06-21

## 2024-06-20 RX ORDER — ATORVASTATIN CALCIUM 40 MG/1
40 TABLET, FILM COATED ORAL NIGHTLY
Status: DISCONTINUED | OUTPATIENT
Start: 2024-06-20 | End: 2024-06-21 | Stop reason: HOSPADM

## 2024-06-20 RX ORDER — SODIUM CHLORIDE 0.9 % (FLUSH) 0.9 %
10 SYRINGE (ML) INJECTION PRN
Status: DISCONTINUED | OUTPATIENT
Start: 2024-06-20 | End: 2024-06-21 | Stop reason: HOSPADM

## 2024-06-20 RX ORDER — FUROSEMIDE 10 MG/ML
20 INJECTION INTRAMUSCULAR; INTRAVENOUS ONCE
Status: COMPLETED | OUTPATIENT
Start: 2024-06-20 | End: 2024-06-20

## 2024-06-20 RX ORDER — POTASSIUM CHLORIDE 20 MEQ/1
40 TABLET, EXTENDED RELEASE ORAL ONCE
Status: COMPLETED | OUTPATIENT
Start: 2024-06-20 | End: 2024-06-20

## 2024-06-20 RX ORDER — SODIUM CHLORIDE 0.9 % (FLUSH) 0.9 %
5-40 SYRINGE (ML) INJECTION EVERY 12 HOURS SCHEDULED
Status: DISCONTINUED | OUTPATIENT
Start: 2024-06-20 | End: 2024-06-21 | Stop reason: HOSPADM

## 2024-06-20 RX ORDER — ASPIRIN 81 MG/1
81 TABLET, CHEWABLE ORAL DAILY
Status: DISCONTINUED | OUTPATIENT
Start: 2024-06-21 | End: 2024-06-21 | Stop reason: HOSPADM

## 2024-06-20 RX ORDER — POTASSIUM CHLORIDE 7.45 MG/ML
10 INJECTION INTRAVENOUS PRN
Status: DISCONTINUED | OUTPATIENT
Start: 2024-06-20 | End: 2024-06-21 | Stop reason: HOSPADM

## 2024-06-20 RX ORDER — ONDANSETRON 4 MG/1
4 TABLET, ORALLY DISINTEGRATING ORAL EVERY 8 HOURS PRN
Status: DISCONTINUED | OUTPATIENT
Start: 2024-06-20 | End: 2024-06-21 | Stop reason: HOSPADM

## 2024-06-20 RX ORDER — POTASSIUM CHLORIDE 20 MEQ/1
40 TABLET, EXTENDED RELEASE ORAL PRN
Status: DISCONTINUED | OUTPATIENT
Start: 2024-06-20 | End: 2024-06-21 | Stop reason: HOSPADM

## 2024-06-20 RX ORDER — ASPIRIN 81 MG/1
324 TABLET, CHEWABLE ORAL ONCE
Status: COMPLETED | OUTPATIENT
Start: 2024-06-20 | End: 2024-06-20

## 2024-06-20 RX ORDER — CLOPIDOGREL BISULFATE 75 MG/1
75 TABLET ORAL DAILY
Status: DISCONTINUED | OUTPATIENT
Start: 2024-06-20 | End: 2024-06-21 | Stop reason: HOSPADM

## 2024-06-20 RX ORDER — NITROGLYCERIN 0.4 MG/1
0.4 TABLET SUBLINGUAL EVERY 5 MIN PRN
Status: DISCONTINUED | OUTPATIENT
Start: 2024-06-20 | End: 2024-06-21 | Stop reason: HOSPADM

## 2024-06-20 RX ORDER — ACETAMINOPHEN 650 MG/1
650 SUPPOSITORY RECTAL EVERY 6 HOURS PRN
Status: DISCONTINUED | OUTPATIENT
Start: 2024-06-20 | End: 2024-06-21 | Stop reason: HOSPADM

## 2024-06-20 RX ORDER — ENOXAPARIN SODIUM 100 MG/ML
40 INJECTION SUBCUTANEOUS DAILY
Status: DISCONTINUED | OUTPATIENT
Start: 2024-06-20 | End: 2024-06-20

## 2024-06-20 RX ORDER — ALBUTEROL SULFATE 90 UG/1
2 AEROSOL, METERED RESPIRATORY (INHALATION) EVERY 4 HOURS PRN
Status: DISCONTINUED | OUTPATIENT
Start: 2024-06-20 | End: 2024-06-21 | Stop reason: HOSPADM

## 2024-06-20 RX ORDER — ONDANSETRON 2 MG/ML
4 INJECTION INTRAMUSCULAR; INTRAVENOUS EVERY 6 HOURS PRN
Status: DISCONTINUED | OUTPATIENT
Start: 2024-06-20 | End: 2024-06-21 | Stop reason: HOSPADM

## 2024-06-20 RX ORDER — UREA 10 %
1000 LOTION (ML) TOPICAL DAILY
Status: DISCONTINUED | OUTPATIENT
Start: 2024-06-20 | End: 2024-06-21 | Stop reason: HOSPADM

## 2024-06-20 RX ORDER — ALBUTEROL SULFATE 2.5 MG/3ML
2.5 SOLUTION RESPIRATORY (INHALATION) EVERY 4 HOURS PRN
Status: DISCONTINUED | OUTPATIENT
Start: 2024-06-20 | End: 2024-06-21 | Stop reason: HOSPADM

## 2024-06-20 RX ORDER — ACETAMINOPHEN 325 MG/1
650 TABLET ORAL EVERY 6 HOURS PRN
Status: DISCONTINUED | OUTPATIENT
Start: 2024-06-20 | End: 2024-06-21 | Stop reason: HOSPADM

## 2024-06-20 RX ORDER — SODIUM CHLORIDE 9 MG/ML
INJECTION, SOLUTION INTRAVENOUS PRN
Status: DISCONTINUED | OUTPATIENT
Start: 2024-06-20 | End: 2024-06-21 | Stop reason: HOSPADM

## 2024-06-20 RX ADMIN — POTASSIUM CHLORIDE 40 MEQ: 1500 TABLET, EXTENDED RELEASE ORAL at 22:02

## 2024-06-20 RX ADMIN — SODIUM CHLORIDE, PRESERVATIVE FREE 10 ML: 5 INJECTION INTRAVENOUS at 22:03

## 2024-06-20 RX ADMIN — FUROSEMIDE 20 MG: 10 INJECTION, SOLUTION INTRAMUSCULAR; INTRAVENOUS at 22:02

## 2024-06-20 RX ADMIN — ASPIRIN 81 MG 324 MG: 81 TABLET ORAL at 18:01

## 2024-06-20 ASSESSMENT — PAIN DESCRIPTION - ORIENTATION: ORIENTATION: MID

## 2024-06-20 ASSESSMENT — PAIN - FUNCTIONAL ASSESSMENT: PAIN_FUNCTIONAL_ASSESSMENT: 0-10

## 2024-06-20 ASSESSMENT — PAIN SCALES - GENERAL: PAINLEVEL_OUTOF10: 5

## 2024-06-20 ASSESSMENT — PAIN DESCRIPTION - LOCATION: LOCATION: CHEST

## 2024-06-20 NOTE — ED PROVIDER NOTES
[facility] OBSERVATION UNIT  Emergency Department Encounter  Emergency Medicine Resident     Pt Name:Harjeet Palmer  MRN: [id number]  Birthdate 1950  Date of evaluation: 6/20/24  PCP:  No primary care provider on file.  Note Started: 3:11 PM EDT      CHIEF COMPLAINT       No chief complaint on file.  Chest pain, shortness of breath    HISTORY OF PRESENT ILLNESS  (Location/Symptom, Timing/Onset, Context/Setting, Quality, Duration, Modifying Factors, Severity.)      Harjeet Palmer is a 73 y.o. male with history of CHF with low ejection fraction, coronary artery disease with stents in place, COPD, pacemaker defibrillator placement who presents with 2 weeks of intermittent chest pressure, worsening orthopnea, intermittent lightheadedness.  He reports being at [facility] with his brother today when patient became very lightheaded and was near syncopal.  Patient did not fall or hit his head.  States recently he has had significant chest pressure and feels like there is somebody kneeing him in the chest.  States this is worse with lying flat as well as recent increase in orthopnea.  Feels his legs are more swollen than normal.  Patient feels he cannot lay flat.  Patient states he was recently evaluated at a hospital near [place] and had a cardiac catheterization performed.      PAST MEDICAL / SURGICAL / SOCIAL / FAMILY HISTORY      has a past medical history of Asbestos exposure, CAD (coronary artery disease), CAD (coronary artery disease), COPD (chronic obstructive pulmonary disease) ([facility]), H/O heart artery stent, Hyperlipemia, MI, old, Tobacco abuse, and V-tach ([facility]).       has a past surgical history that includes pacemaker placement; Cardiac catheterization (06/13/2024); and vascular surgery.      Social History     Socioeconomic History    Marital status: Single     Spouse name: Not on file    Number of children: Not on file    Years of education: Not on file    Highest education level: Not on file   Occupational  History    Not on file   Tobacco Use    Smoking status: Every Day     Types: Cigarettes    Smokeless tobacco: Never   Vaping Use    Vaping Use: Never used   Substance and Sexual Activity    Alcohol use: Yes     Comment: socailly    Drug use: Never    Sexual activity: Never   Other Topics Concern    Not on file   Social History Narrative    Not on file     Social Determinants of Health     Financial Resource Strain: Not on file   Food Insecurity: Not on file   Transportation Needs: Not on file   Physical Activity: Not on file   Stress: Not on file   Social Connections: Not on file   Intimate Partner Violence: Not on file   Housing Stability: Not on file       History reviewed. No pertinent family history.    Allergies:  Baclofen and Influenza virus vaccine    Home Medications:  Prior to Admission medications    Not on File         REVIEW OF SYSTEMS       Review of Systems   Constitutional:  Negative for chills and fever.   Respiratory:  Positive for shortness of breath.    Cardiovascular:  Positive for chest pain and leg swelling.   Gastrointestinal:  Negative for abdominal pain and nausea.   Musculoskeletal:  Negative for back pain.   Neurological:  Negative for headaches.       PHYSICAL EXAM      INITIAL VITALS:   /76   Pulse 61   Temp 98.2 °F (36.8 °C) (Oral)   Resp 19   Ht 1.854 m (6' 1\")   Wt 77.1 kg (170 lb)   SpO2 96%   BMI 22.43 kg/m²     Physical Exam  Vitals reviewed.   Constitutional:       General: He is not in acute distress.  HENT:      Head: Normocephalic and atraumatic.   Cardiovascular:      Rate and Rhythm: Normal rate and regular rhythm.      Pulses: Normal pulses.   Pulmonary:      Effort: Pulmonary effort is normal.   Abdominal:      Palpations: Abdomen is soft.      Tenderness: There is no abdominal tenderness.   Musculoskeletal:      Right lower leg: Edema present.      Left lower leg: Edema present.   Skin:     General: Skin is warm and dry.   Neurological:      General: No focal

## 2024-06-20 NOTE — ED PROVIDER NOTES
[facility] ED     Emergency Department     Faculty Attestation    I performed a history and physical examination of the patient and discussed management with the resident. I reviewed the resident’s note and agree with the documented findings and plan of care. Any areas of disagreement are noted on the chart. I was personally present for the key portions of any procedures. I have documented in the chart those procedures where I was not present during the key portions. I have reviewed the emergency nurses triage note. I agree with the chief complaint, past medical history, past surgical history, allergies, medications, social and family history as documented unless otherwise noted below. For Physician Assistant/ Nurse Practitioner cases/documentation I have personally evaluated this patient and have completed at least one if not all key elements of the E/M (history, physical exam, and MDM). Additional findings are as noted.    Note Started: 3:47 PM EDT    Patient here with continuing chest pain lightheadedness.  Patient has extensive cardiac history.  He did just have a cath done on 6/12 at [facility].  Initially this was not in the system but it is available now see below.  Pain is anterior nonradiating associate with lightheadedness.  He states he is compliant with all his medications.  On exam resting comfortably chatting with the nurse.  Lungs clear and equal heart regular equal radial pedal pulses abdomen soft nontender.  Trace edema at the ankles no calf tenderness.  Will check labs cardiac workup probable admit given unsuccessful PCI angioplasty attempt of 100% circumflex      Critical Care     none    Wilfrido Shen MD, FACEP, FAAEM  Attending Emergency  Physician    EKG interpretation: There is a atrial paced rhythm with a ventricular rate of 62 with a wide QRS at 142 there is a right axis deviation.  There is T wave inversion present in the inferior anterior and low lateral leads

## 2024-06-20 NOTE — H&P
[facility]  Office: [phone]  Bon Haq DO, Arnie Huang DO, Darrell Rosenberg DO, Dexter Waggoner DO, Lamont Mendez MD, Kimberly Hernandez MD, Sheri Smith MD, Ethel Walsh MD,  Piotr Rahman MD, Hannah Harding MD, Rich Hernadez MD,  Santos Valles DO, Viktor Boggs MD, Vladimir Christensen MD, Wilfrido Haq DO, Lesa Garber MD,  Lincoln Magallanes DO, Rossy Baker MD, Azalea Arthur MD, Laura Shankar MD, Nacho Sales MD,  Jackson Jo MD, Anne Marie Arreola MD, Maria D Burns MD, Hayley Mace MD, Solitario Buckley MD, Mechelle Valentino MD, Siddhartha López DO, Oscar Shah DO, Hannah Mccrary MD,  Mirza Carlton MD, Shirley Waterhouse, CNP,  Marie Conley CNP, Zaid Potter, CNP,  Rosalba Carpenter, DNP, Oly Espinoza, CNP, Fara Oliver, CNP, Verona Jean CNP, Greer Jackson, CNP, Anaya Cintron, PA-C, Candy Tompkins PA-C, Olive Gan, CNP, Ruba Jama, CNP, Compa Myers, CNP, Erendira Cameron, CNP, Tammie Chavez, CNP, Joycelyn Junior, CNS, Genesis Jin, CNP, Ilana Duong CNP, Tracy Schwab, CNP         [facility]   IN-PATIENT SERVICE   [facility]    HISTORY AND PHYSICAL EXAMINATION            Date:   6/20/2024  Patient name:  Harjeet Palmer  Date of admission:  6/20/2024  2:36 PM  MRN:   [id number]  Account:  [id number]  YOB: 1950  PCP:    No primary care provider on file.  Room:   [facility]  Code Status:    Full Code    Chief Complaint:     Mostly felt tired and dizzy while at [employer] today with brother    History Obtained From:     patient and medical record    History of Present Illness:     Harjeet Palmer is a 73 y.o.  male with pmh paroxysmal A-fib , heart failure with reduced ejection fraction , status post [place] PPM 2/2 sss,  known CAD multiple cardiac stents s/p -[facility], status post aortic repair/graft , tobacco abuse (states he stopped smoking 2 weeks ago), hyperlipidemia, V. tach who presents with mid lower      Baclofen and Influenza virus vaccine    Social History:     Tobacco:    reports that he has been smoking cigarettes. He has never used smokeless tobacco.  Alcohol:      reports current alcohol use.  Drug Use:  reports no history of drug use.    Family History:     History reviewed. No pertinent family history.    Review of Systems:     Positive and Negative as described in HPI.    CONSTITUTIONAL:  negative for fevers, chills, sweats, fatigue, weight loss  HEENT:  negative for vision, hearing changes, runny nose, throat pain  RESPIRATORY:+ shortness of breath while lying flat;  denies cough, congestion, wheezing  CARDIOVASCULAR: Midsternal +chest pain, denies palpitations  GASTROINTESTINAL:  negative for nausea, vomiting, diarrhea, constipation, change in bowel habits, abdominal pain   GENITOURINARY:  negative for difficulty of urination, burning with urination, frequency   INTEGUMENT:  negative for rash, skin lesions, easy bruising   HEMATOLOGIC/LYMPHATIC: +swelling/edema bilateral ankles and feet  ALLERGIC/IMMUNOLOGIC:  negative for urticaria , itching  ENDOCRINE:  negative increase in drinking, increase in urination, hot or cold intolerance  MUSCULOSKELETAL:  negative joint pains, muscle aches, swelling of joints  NEUROLOGICAL:  negative for headaches, -dizziness,+ occasional lightheadedness, -numbness, -pain, -tingling extremities  BEHAVIOR/PSYCH:  negative for depression, anxiety    Physical Exam:   BP 98/61   Pulse 63   Temp 97.5 °F (36.4 °C) (Oral)   Resp 16   Ht 1.854 m (6' 1\")   Wt 77.1 kg (170 lb)   SpO2 93%   BMI 22.43 kg/m²   Temp (24hrs), Av.1 °F (36.2 °C), Min:96.6 °F (35.9 °C), Max:97.5 °F (36.4 °C)    No results for input(s): \"POCGLU\" in the last 72 hours.  No intake or output data in the 24 hours ending 24 [id number]    General Appearance: alert, well appearing, and in no acute distress  Mental status: oriented to person, place, and time  Head: normocephalic, atraumatic  Eye: no

## 2024-06-20 NOTE — ED NOTES
Patient reports to ED with c/o chest pain and swollen ankles. Patient states that the chest pain has been going on for 2 weeks now but has gotten worse today. Patient was ambulatory upon arrival to ED. VSS. Patient is resting comfortably in bed with call light in reach.

## 2024-06-20 NOTE — ED NOTES
ED to inpatient nurses report      Chief Complaint:  No chief complaint on file.    Present to ED from: home    MOA:     LOC: alert and orientated to name, place, date  Mobility: Independent  Oxygen Baseline: room air    Current needs required: None   Pending ED orders: No  Present condition: stable    Why did the patient come to the ED? Chest pain x2 weeks   What is the plan? Admit for obs  Any procedures or intervention occur? No  Any safety concerns??    Mental Status:       Psych Assessment:   Psychosocial  Psychosocial (WDL): Within Defined Limits  Vital signs   Vitals:    06/20/24 1441 06/20/24 1442 06/20/24 1456 06/20/24 1526   BP: 117/67  (!) 105/53 98/61   Pulse: 64  60 63   Resp:       Temp:  97.5 °F (36.4 °C)     TempSrc:  Oral     SpO2: 92%  93% 93%   Weight:       Height:            Vitals:  Patient Vitals for the past 24 hrs:   BP Temp Temp src Pulse Resp SpO2 Height Weight   06/20/24 1526 98/61 -- -- 63 -- 93 % -- --   06/20/24 1456 (!) 105/53 -- -- 60 -- 93 % -- --   06/20/24 1442 -- 97.5 °F (36.4 °C) Oral -- -- -- -- --   06/20/24 1441 117/67 -- -- 64 -- 92 % -- --   06/20/24 1433 -- -- -- -- 16 -- -- --   06/20/24 1432 100/63 (!) 96.6 °F (35.9 °C) Oral 63 -- 97 % 1.854 m (6' 1\") 77.1 kg (170 lb)      Visit Vitals  BP 98/61   Pulse 63   Temp 97.5 °F (36.4 °C) (Oral)   Resp 16   Ht 1.854 m (6' 1\")   Wt 77.1 kg (170 lb)   SpO2 93%   BMI 22.43 kg/m²        LDAs:   Peripheral IV 06/20/24 Left;Posterior Forearm (Active)   Site Assessment Clean, dry & intact 06/20/24 1447   Line Status Blood return noted;Flushed 06/20/24 1447   Phlebitis Assessment No symptoms 06/20/24 1447   Infiltration Assessment 0 06/20/24 1447   Dressing Status New dressing applied 06/20/24 1447   Dressing Type Transparent 06/20/24 1447   Dressing Intervention New 06/20/24 1447       Ambulatory Status:  No data recorded    Diagnosis:  DISPOSITION Decision To Admit 06/20/2024 05:49:30 PM   Final diagnoses:   Chest pain, unspecified

## 2024-06-21 VITALS
OXYGEN SATURATION: 96 % | SYSTOLIC BLOOD PRESSURE: 98 MMHG | HEART RATE: 71 BPM | DIASTOLIC BLOOD PRESSURE: 87 MMHG | WEIGHT: 180.78 LBS | TEMPERATURE: 97.5 F | BODY MASS INDEX: 23.96 KG/M2 | RESPIRATION RATE: 18 BRPM | HEIGHT: 73 IN

## 2024-06-21 PROBLEM — Z98.61 CAD S/P PERCUTANEOUS CORONARY ANGIOPLASTY: Status: ACTIVE | Noted: 2024-06-21

## 2024-06-21 PROBLEM — R06.01 ORTHOPNEA: Status: ACTIVE | Noted: 2024-06-21

## 2024-06-21 PROBLEM — I25.5 ISCHEMIC CARDIOMYOPATHY: Status: ACTIVE | Noted: 2024-06-21

## 2024-06-21 PROBLEM — I25.10 CAD S/P PERCUTANEOUS CORONARY ANGIOPLASTY: Status: ACTIVE | Noted: 2024-06-21

## 2024-06-21 PROBLEM — Z95.810 AUTOMATIC IMPLANTABLE CARDIOVERTER-DEFIBRILLATOR IN SITU: Status: ACTIVE | Noted: 2024-06-21

## 2024-06-21 PROBLEM — I20.89 STABLE ANGINA (HCC): Status: ACTIVE | Noted: 2024-06-21

## 2024-06-21 LAB
ALBUMIN SERPL-MCNC: 3.7 G/DL (ref 3.5–5.2)
ALBUMIN/GLOB SERPL: 1 {RATIO} (ref 1–2.5)
ALP SERPL-CCNC: 69 U/L (ref 40–129)
ALT SERPL-CCNC: 11 U/L (ref 10–50)
ANION GAP SERPL CALCULATED.3IONS-SCNC: 10 MMOL/L (ref 9–16)
AST SERPL-CCNC: 25 U/L (ref 10–50)
BASOPHILS # BLD: 0.06 K/UL (ref 0–0.2)
BASOPHILS NFR BLD: 1 % (ref 0–2)
BILIRUB DIRECT SERPL-MCNC: <0.2 MG/DL (ref 0–0.3)
BILIRUB INDIRECT SERPL-MCNC: ABNORMAL MG/DL (ref 0–1)
BILIRUB SERPL-MCNC: 0.6 MG/DL (ref 0–1.2)
BNP SERPL-MCNC: 663 PG/ML (ref 0–300)
BUN SERPL-MCNC: 16 MG/DL (ref 8–23)
CALCIUM SERPL-MCNC: 8.5 MG/DL (ref 8.6–10.4)
CHLORIDE SERPL-SCNC: 102 MMOL/L (ref 98–107)
CHOLEST SERPL-MCNC: 159 MG/DL (ref 0–199)
CHOLESTEROL/HDL RATIO: 5
CO2 SERPL-SCNC: 25 MMOL/L (ref 20–31)
CREAT SERPL-MCNC: 0.7 MG/DL (ref 0.7–1.2)
EKG ATRIAL RATE: 60 BPM
EKG ATRIAL RATE: 85 BPM
EKG P AXIS: 116 DEGREES
EKG P AXIS: 6 DEGREES
EKG Q-T INTERVAL: 476 MS
EKG Q-T INTERVAL: 492 MS
EKG QRS DURATION: 142 MS
EKG QRS DURATION: 182 MS
EKG QTC CALCULATION (BAZETT): 483 MS
EKG QTC CALCULATION (BAZETT): 615 MS
EKG R AXIS: -73 DEGREES
EKG R AXIS: 101 DEGREES
EKG T AXIS: -56 DEGREES
EKG T AXIS: 98 DEGREES
EKG VENTRICULAR RATE: 62 BPM
EKG VENTRICULAR RATE: 94 BPM
EOSINOPHIL # BLD: 0.19 K/UL (ref 0–0.44)
EOSINOPHILS RELATIVE PERCENT: 3 % (ref 1–4)
ERYTHROCYTE [DISTWIDTH] IN BLOOD BY AUTOMATED COUNT: 14.4 % (ref 11.8–14.4)
GFR, ESTIMATED: >90 ML/MIN/1.73M2
GLOBULIN SER CALC-MCNC: 2.6 G/DL
GLUCOSE SERPL-MCNC: 122 MG/DL (ref 74–99)
HCT VFR BLD AUTO: 47.5 % (ref 40.7–50.3)
HDLC SERPL-MCNC: 34 MG/DL
HGB BLD-MCNC: 14.3 G/DL (ref 13–17)
IMM GRANULOCYTES # BLD AUTO: 0.06 K/UL (ref 0–0.3)
IMM GRANULOCYTES NFR BLD: 1 %
LDLC SERPL CALC-MCNC: 105 MG/DL (ref 0–100)
LIPASE SERPL-CCNC: 22 U/L (ref 13–60)
LYMPHOCYTES NFR BLD: 1.64 K/UL (ref 1.1–3.7)
LYMPHOCYTES RELATIVE PERCENT: 24 % (ref 24–43)
MAGNESIUM SERPL-MCNC: 2.5 MG/DL (ref 1.6–2.4)
MCH RBC QN AUTO: 29.2 PG (ref 25.2–33.5)
MCHC RBC AUTO-ENTMCNC: 30.1 G/DL (ref 28.4–34.8)
MCV RBC AUTO: 96.9 FL (ref 82.6–102.9)
MONOCYTES NFR BLD: 1.02 K/UL (ref 0.1–1.2)
MONOCYTES NFR BLD: 15 % (ref 3–12)
NEUTROPHILS NFR BLD: 57 % (ref 36–65)
NEUTS SEG NFR BLD: 3.86 K/UL (ref 1.5–8.1)
NRBC BLD-RTO: 0 PER 100 WBC
PLATELET, FLUORESCENCE: 132 K/UL (ref 138–453)
PLATELETS.RETICULATED NFR BLD AUTO: 3.9 % (ref 1.1–10.3)
PMV BLD AUTO: ABNORMAL FL (ref 8.1–13.5)
POTASSIUM SERPL-SCNC: 4.5 MMOL/L (ref 3.7–5.3)
PROT SERPL-MCNC: 6.3 G/DL (ref 6.6–8.7)
RBC # BLD AUTO: 4.9 M/UL (ref 4.21–5.77)
SODIUM SERPL-SCNC: 137 MMOL/L (ref 136–145)
TRIGL SERPL-MCNC: 102 MG/DL
VLDLC SERPL CALC-MCNC: 20 MG/DL
WBC OTHER # BLD: 6.8 K/UL (ref 3.5–11.3)

## 2024-06-21 PROCEDURE — 80061 LIPID PANEL: CPT

## 2024-06-21 PROCEDURE — 83690 ASSAY OF LIPASE: CPT

## 2024-06-21 PROCEDURE — 6370000000 HC RX 637 (ALT 250 FOR IP): Performed by: INTERNAL MEDICINE

## 2024-06-21 PROCEDURE — 36415 COLL VENOUS BLD VENIPUNCTURE: CPT

## 2024-06-21 PROCEDURE — 2580000003 HC RX 258: Performed by: NURSE PRACTITIONER

## 2024-06-21 PROCEDURE — 99223 1ST HOSP IP/OBS HIGH 75: CPT | Performed by: INTERNAL MEDICINE

## 2024-06-21 PROCEDURE — 83735 ASSAY OF MAGNESIUM: CPT

## 2024-06-21 PROCEDURE — 83880 ASSAY OF NATRIURETIC PEPTIDE: CPT

## 2024-06-21 PROCEDURE — 93005 ELECTROCARDIOGRAM TRACING: CPT

## 2024-06-21 PROCEDURE — 80048 BASIC METABOLIC PNL TOTAL CA: CPT

## 2024-06-21 PROCEDURE — 6370000000 HC RX 637 (ALT 250 FOR IP): Performed by: NURSE PRACTITIONER

## 2024-06-21 PROCEDURE — G0378 HOSPITAL OBSERVATION PER HR: HCPCS

## 2024-06-21 PROCEDURE — 85025 COMPLETE CBC W/AUTO DIFF WBC: CPT

## 2024-06-21 PROCEDURE — 80076 HEPATIC FUNCTION PANEL: CPT

## 2024-06-21 RX ORDER — ATORVASTATIN CALCIUM 40 MG/1
40 TABLET, FILM COATED ORAL DAILY
COMMUNITY

## 2024-06-21 RX ORDER — FUROSEMIDE 20 MG/1
20 TABLET ORAL DAILY
Status: DISCONTINUED | OUTPATIENT
Start: 2024-06-21 | End: 2024-06-21 | Stop reason: HOSPADM

## 2024-06-21 RX ORDER — METOPROLOL SUCCINATE 25 MG/1
25 TABLET, EXTENDED RELEASE ORAL DAILY
Qty: 30 TABLET | Refills: 3 | Status: SHIPPED | OUTPATIENT
Start: 2024-06-22

## 2024-06-21 RX ORDER — CARVEDILOL 6.25 MG/1
6.25 TABLET ORAL EVERY 12 HOURS
Status: ON HOLD | COMMUNITY
End: 2024-06-21 | Stop reason: HOSPADM

## 2024-06-21 RX ORDER — TRAMADOL HYDROCHLORIDE 50 MG/1
50 TABLET ORAL EVERY 12 HOURS PRN
COMMUNITY

## 2024-06-21 RX ORDER — METOPROLOL SUCCINATE 25 MG/1
25 TABLET, EXTENDED RELEASE ORAL DAILY
Status: DISCONTINUED | OUTPATIENT
Start: 2024-06-21 | End: 2024-06-21 | Stop reason: HOSPADM

## 2024-06-21 RX ORDER — CLOPIDOGREL BISULFATE 75 MG/1
75 TABLET ORAL DAILY
COMMUNITY

## 2024-06-21 RX ORDER — RANOLAZINE 500 MG/1
500 TABLET, EXTENDED RELEASE ORAL 2 TIMES DAILY
Qty: 60 TABLET | Refills: 3 | Status: SHIPPED | OUTPATIENT
Start: 2024-06-21

## 2024-06-21 RX ORDER — GABAPENTIN 100 MG/1
100 CAPSULE ORAL 2 TIMES DAILY
COMMUNITY

## 2024-06-21 RX ORDER — RANOLAZINE 500 MG/1
500 TABLET, EXTENDED RELEASE ORAL 2 TIMES DAILY
Status: DISCONTINUED | OUTPATIENT
Start: 2024-06-21 | End: 2024-06-21 | Stop reason: HOSPADM

## 2024-06-21 RX ORDER — FUROSEMIDE 20 MG/1
20 TABLET ORAL DAILY
Qty: 60 TABLET | Refills: 3 | Status: SHIPPED | OUTPATIENT
Start: 2024-06-22

## 2024-06-21 RX ORDER — VARENICLINE TARTRATE 1 MG/1
1 TABLET, FILM COATED ORAL 2 TIMES DAILY
COMMUNITY

## 2024-06-21 RX ORDER — ISOSORBIDE MONONITRATE 30 MG/1
30 TABLET, EXTENDED RELEASE ORAL DAILY
Status: DISCONTINUED | OUTPATIENT
Start: 2024-06-21 | End: 2024-06-21 | Stop reason: HOSPADM

## 2024-06-21 RX ORDER — SPIRONOLACTONE 25 MG/1
25 TABLET ORAL DAILY
Status: DISCONTINUED | OUTPATIENT
Start: 2024-06-21 | End: 2024-06-21 | Stop reason: HOSPADM

## 2024-06-21 RX ORDER — ISOSORBIDE MONONITRATE 30 MG/1
30 TABLET, EXTENDED RELEASE ORAL DAILY
Qty: 30 TABLET | Refills: 3 | Status: SHIPPED | OUTPATIENT
Start: 2024-06-22

## 2024-06-21 RX ORDER — SPIRONOLACTONE 25 MG/1
25 TABLET ORAL DAILY
Qty: 30 TABLET | Refills: 3 | Status: SHIPPED | OUTPATIENT
Start: 2024-06-22

## 2024-06-21 RX ADMIN — SPIRONOLACTONE 25 MG: 25 TABLET ORAL at 09:29

## 2024-06-21 RX ADMIN — APIXABAN 5 MG: 5 TABLET, FILM COATED ORAL at 09:29

## 2024-06-21 RX ADMIN — ISOSORBIDE MONONITRATE 30 MG: 30 TABLET, EXTENDED RELEASE ORAL at 09:29

## 2024-06-21 RX ADMIN — Medication 1000 MCG: at 09:28

## 2024-06-21 RX ADMIN — METOPROLOL SUCCINATE 25 MG: 25 TABLET, EXTENDED RELEASE ORAL at 09:29

## 2024-06-21 RX ADMIN — ACETAMINOPHEN 650 MG: 325 TABLET ORAL at 00:49

## 2024-06-21 RX ADMIN — CLOPIDOGREL BISULFATE 75 MG: 75 TABLET ORAL at 09:29

## 2024-06-21 RX ADMIN — SODIUM CHLORIDE, PRESERVATIVE FREE 10 ML: 5 INJECTION INTRAVENOUS at 09:30

## 2024-06-21 RX ADMIN — SACUBITRIL AND VALSARTAN 1 TABLET: 24; 26 TABLET, FILM COATED ORAL at 09:29

## 2024-06-21 RX ADMIN — RANOLAZINE 500 MG: 500 TABLET, FILM COATED, EXTENDED RELEASE ORAL at 09:29

## 2024-06-21 RX ADMIN — EMPAGLIFLOZIN 10 MG: 10 TABLET, FILM COATED ORAL at 09:29

## 2024-06-21 RX ADMIN — FUROSEMIDE 20 MG: 20 TABLET ORAL at 09:29

## 2024-06-21 ASSESSMENT — PAIN SCALES - GENERAL
PAINLEVEL_OUTOF10: 6
PAINLEVEL_OUTOF10: 4

## 2024-06-21 ASSESSMENT — PAIN DESCRIPTION - LOCATION: LOCATION: HEAD;CHEST

## 2024-06-21 NOTE — DISCHARGE SUMMARY
fair     Discharged Condition: good    Hospital Stay:     Hospital Course:  Harjeet Palmer is a 73 y.o. male who was admitted for the management of Chest pain , presented to ER with chest pressure, orthopnea.    This very pleasant 73-year-old male presented to hospital with chest pressure and orthopnea.  Patient has a known cardiomyopathy with ejection fraction of approximately 35%.  He had lower extremity edema and symptoms consistent with an acute systolic CHF exacerbation.  The patient was diuresed and his symptoms did improve.  While in the hospital he was seen by cardiology and underwent significant medication titration to help with his anginal symptoms and his systolic dysfunction.  His Coreg was transitioned to Toprol.  He was placed on Imdur and Ranexa for his anginal symptoms.  In addition to his Entresto he is being discharged on Lasix and spironolactone.  He has been instructed to follow-up with his primary care physician and primary cardiologist for further titration of medications.  Patient is discharged home in stable condition.    Significant therapeutic interventions: As above    Significant Diagnostic Studies:   Labs:  Hematology:  Recent Labs     06/20/24  1514 06/21/24  0922   WBC 7.1 6.8   RBC 4.90 4.90   HGB 14.2 14.3   HCT 45.6 47.5   MCV 93.1 96.9   MCH 29.0 29.2   MCHC 31.1 30.1   RDW 14.1 14.4     --    MPV 9.5 Platelet clumps present, count appears adequate.     Chemistry:  Recent Labs     06/20/24  1514 06/20/24  1643 06/21/24  0922     --  137   K 3.7  --  4.5   CL 98  --  102   CO2 32*  --  25   GLUCOSE 167*  --  122*   BUN 17  --  16   CREATININE 1.0  --  0.7   MG 2.2  --  2.5*   ANIONGAP 9  --  10   LABGLOM 77  --  >90   CALCIUM 8.3*  --  8.5*   PROBNP 1,419*  --  PENDING   TROPHS 19 15  --      Recent Labs     06/20/24  1514 06/21/24  0922   TSH 13.60*  --    AST 22 25   ALT 10 11   ALKPHOS 70 69   BILITOT 0.5 0.6   BILIDIR  --  <0.2   LIPASE  --  22   CHOL  --  159   HDL   --  34*   CHOLHDLRATIO  --  5.0   TRIG  --  102   VLDL  --  20     ABG:No results found for: \"POCPH\", \"PHART\", \"PH\", \"POCPCO2\", \"SEV9GXS\", \"PCO2\", \"POCPO2\", \"PO2ART\", \"PO2\", \"POCHCO3\", \"DAT3ROC\", \"HCO3\", \"NBEA\", \"PBEA\", \"BEART\", \"BE\", \"THGBART\", \"THB\", \"JQT4RYY\", \"YGJP2UXI\", \"N2OMCEWD\", \"O2SAT\", \"FIO2\"  No results found for: \"SPECIAL\"  No results found for: \"CULTURE\"    Radiology:  XR CHEST (2 VW)    Result Date: 6/20/2024  No acute process.       Consultations:    Consults:     Final Specialist Recommendations/Findings:   IP CONSULT TO HOSPITALIST  IP CONSULT TO CARDIOLOGY      The patient was seen and examined on day of discharge and this discharge summary is in conjunction with any daily progress note from day of discharge.    Discharge plan:     Disposition: Home    Physician Follow Up:   PCP    Follow up      Primary cardiologist    Follow up         Requiring Further Evaluation/Follow Up POST HOSPITALIZATION/Incidental Findings: Follow-up with cardiology as instructed    Diet: regular diet    Activity: As tolerated    Instructions to Patient: None    Discharge Medications:      Medication List        START taking these medications      furosemide 20 MG tablet  Commonly known as: LASIX  Take 1 tablet by mouth daily  Start taking on: June 22, 2024     isosorbide mononitrate 30 MG extended release tablet  Commonly known as: IMDUR  Take 1 tablet by mouth daily  Start taking on: June 22, 2024     metoprolol succinate 25 MG extended release tablet  Commonly known as: TOPROL XL  Take 1 tablet by mouth daily  Start taking on: June 22, 2024     ranolazine 500 MG extended release tablet  Commonly known as: RANEXA  Take 1 tablet by mouth 2 times daily     spironolactone 25 MG tablet  Commonly known as: ALDACTONE  Take 1 tablet by mouth daily  Start taking on: June 22, 2024            CONTINUE taking these medications      atorvastatin 40 MG tablet  Commonly known as: LIPITOR     clopidogrel 75 MG tablet  Commonly known

## 2024-06-21 NOTE — PROGRESS NOTES
Physical Therapy        Physical Therapy Cancel Note      DATE: 2024    NAME: Harjeet Palmer  MRN: [id number]   : 1950      Patient not seen this date for Physical Therapy due to:    Patient independent with functional mobility. Will defer PT evaluation at this time. Please reorder PT if future needs arise.       Electronically signed by Refugio Roach PT on 2024 at 10:57 AM

## 2024-06-21 NOTE — PLAN OF CARE
Problem: Discharge Planning  Goal: Discharge to home or other facility with appropriate resources  Outcome: Progressing  Flowsheets (Taken 6/21/2024 0220)  Discharge to home or other facility with appropriate resources:   Identify barriers to discharge with patient and caregiver   Arrange for needed discharge resources and transportation as appropriate   Identify discharge learning needs (meds, wound care, etc)     Problem: Pain  Goal: Verbalizes/displays adequate comfort level or baseline comfort level  Outcome: Progressing   Pain scale preformed per protocol and pt treated for pain as documented. Education given.    Problem: Safety - Adult  Goal: Free from fall injury  Outcome: Progressing   Patient assessed for fall risk; fall precautions initiated. Patient and family instructed about safety devices. Environment kept free of clutter and adequate lighting provided.  Bed locked and in lowest position.  Call light within reach.

## 2024-06-21 NOTE — CONSULTS
[facility]    Consult               Today's Date: 6/21/2024  Patient Name: Harjeet Palmer  Date of admission: 6/20/2024  2:36 PM  Patient's age: 73 y.o., 1950  Admission Dx: Orthopnea [R06.01]  Chest pain [R07.9]  Chest pain, unspecified type [R07.9]    Requesting Physician: Wilfrido Haq, DO    Cardiac Evaluation Reason:  high risk unstable angina     History Obtained From: patient and chart review     History of Present Illness:    This patient 73 y.o. years old with past medical history of MI in 2007, CAD s/p cath in 6/13/2024 at [facility], abdominal aortic aneurysm s/p graft, paroxysmal afib, ventricular tachycardia, HFrEF with EF of 35%, COPD and HLD. The patient presented to the ED on 6/20/24 for ongoing chest pain for the past 2-3 weeks and lightheadedness that started yesterday and has since gone away. The chest pain is a mild pressure in the center of the chest that does not radiate anywhere. The patient says that the chest pain has stayed the same since it started and says it is  5/10 at rest and 9/10 when coughing and worse when laying flat. In the ED, ECG showed ventricular pacing w/ HR of 94 and QRS of 182.     Today the patient has a HR of 71, BP of 98/87 and 96% O2 sat on room air. Patient still has the mild ongoing chest pain, but denies any palpitations or feelings of tachycardia. He denies SOB and wheezing but has a chronic cough. Patient notes increasing frequency of lightheadedness that occurs randomly.    Past Medical History:   has a past medical history of Asbestos exposure, CAD (coronary artery disease), CAD (coronary artery disease), COPD (chronic obstructive pulmonary disease) ([facility]), H/O heart artery stent, Hyperlipemia, MI, old, Tobacco abuse, and V-tach ([facility]).    Past Surgical History:   has a past surgical history that includes pacemaker placement; Cardiac catheterization (06/13/2024); and vascular surgery.     Home Medications:    Prior to Admission

## 2024-06-21 NOTE — CARE COORDINATION
DISCHARGE PLANNING EVALUATION: OP/OBSERVATION        6/21/24, 10:47 AM EDT    Harjeet Palmer         Location: OBS 26/26-1   Reason for hospitalization: Orthopnea [R06.01]  Chest pain [R07.9]  Chest pain, unspecified type [R07.9]     CM Services requested for transitional needs.     PCP: No primary care provider on file.    Transportation/Food Security/Housekeeping Addressed:  No issues identified.     Equipment needs:     Case Management Services Information Letter Provided []    Transition plan: living in van, plans to return

## 2024-06-21 NOTE — PROGRESS NOTES
Occupational Therapy    [facility]  Occupational Therapy Not Seen Note    DATE: 2024    NAME: Harjeet Palmer  MRN: [id number]   : 1950      Patient not seen this date for Occupational Therapy due to:    Patient independent with ADLs and functional tasks with no acute OT needs. Will defer OT evaluation at this time. Please reorder OT if future needs arise.     Next Scheduled Treatment: N/A    Electronically signed by MCKENNA Curiel on 2024 at 11:30 AM

## 2024-06-21 NOTE — PROGRESS NOTES
[facility]  Office: [phone]  Bon Haq DO, Arnie Huang DO, Darrell Rosenberg DO, Dexter Waggoner DO, Lamont Mendez MD, Kimberly Hernandez MD, Sheri Smith MD, Ethel Walsh MD,  Piotr Rahman MD, Hannah Harding MD, Rich Hernadez MD,  Santos Valles DO, Viktor Boggs MD, Vladimir Christensen MD, Wilfrido Haq DO, Lesa Garber MD,  Lincoln Magallanes DO, Rossy Baker MD, Azalea Arthur MD, Laura Shankar MD, Nacho Sales MD,  Jackson Jo MD, Anne Marie Arreola MD, Maria D Burns MD, Hayley Mace MD, Solitario Buckley MD, Mechelle Valentino MD, Siddhartha López DO, Oscar Shah DO, Hannah Mccrary MD,  Mirza Carlton MD, Shirley Waterhouse, CNP,  Marie Conley CNP, Zaid Potter, CNP,  Rosalba Carpenter, MARJAN, Oly Espinoza CNP, Fara Oliver, CNP, Verona Jean CNP, Greer Jackson CNP, Anaya Cintron, PA-C, Candy Tompkins PA-C, Olive Gan, CNP, Ruba Jama, CNP, Compa Myers CNP, Erendira Cameron CNP, Tammie Chavez CNP, Joycelyn Junior, CNS, Genesis Jin, CNP, Ilana Duong CNP, Tracy Schwab, CNP         [facility]   IN-PATIENT SERVICE   [facility]    Progress Note    6/21/2024    10:57 AM    Name:   Harjeet Palmer  MRN:     [id number]     Acct:      [id number]   Room:   OBS 26/26-1  IP Day:  0  Admit Date:  6/20/2024  2:36 PM    PCP:   No primary care provider on file.  Code Status:  Full Code    Subjective:     Patient seen in follow-up for orthopnea and chest pressure, patient states \"I am feeling better\"    Patient is feeling better overall.  His symptoms have improved with further diuresis.  I have very long discussion with him regarding his systolic dysfunction and how fluid plays a role in his symptomology.  We also discussed his recent cardiac catheterization and how repeat cardiac catheterization would not offer any particular benefit.  Regarding his anginal symptoms cardiology recommends both Imdur and Ranexa.  His carvedilol is being switched

## 2024-06-21 NOTE — PROGRESS NOTES
CLINICAL PHARMACY NOTE: MEDS TO BEDS    Total # of Prescriptions Filled: 5   The following medications were delivered to the patient:  LASIX 20MG  ISOSORBIDE ER 30  ALDACTONE 25MG  RANOLAZINE ER 50MG   METOPROLOL SUCC ER 25     Additional Documentation:

## 2024-06-22 ASSESSMENT — ENCOUNTER SYMPTOMS
SHORTNESS OF BREATH: 1
BACK PAIN: 0
ABDOMINAL PAIN: 0
NAUSEA: 0

## 2024-06-22 ASSESSMENT — HEART SCORE: ECG: NON-SPECIFC REPOLARIZATION DISTURBANCE/LBTB/PM

## (undated) DEVICE — GUIDEWIRE, RUN THROUGH WIRE, 180CM

## (undated) DEVICE — Device

## (undated) DEVICE — TR BAND, RADIAL COMPRESSION, STANDARD, 24CM

## (undated) DEVICE — DEVICE KIT, INFLATION, CUSTOM, PARMA

## (undated) DEVICE — GUIDEWIRE, INQWIRE, 3MM J, .035 X 210CM, FIXED

## (undated) DEVICE — VALVE, CONTROL BLEEDBACK

## (undated) DEVICE — SHEATH, GLIDESHEATH, SLENDER, 6FR 10CM

## (undated) DEVICE — CATHETER, GUIDING, LAUNCHER, 6FR EBU 3.5

## (undated) DEVICE — CATHETER, OPTITORQUE, 6FR 110CM, TIGER RADIAL 4.0

## (undated) DEVICE — ELECTRODE, MULTIFUNCTION, QUICK-COMBO, EDGE SYSTEM, 2 FT